# Patient Record
Sex: FEMALE | Race: WHITE | NOT HISPANIC OR LATINO | Employment: FULL TIME | URBAN - METROPOLITAN AREA
[De-identification: names, ages, dates, MRNs, and addresses within clinical notes are randomized per-mention and may not be internally consistent; named-entity substitution may affect disease eponyms.]

---

## 2017-08-17 ENCOUNTER — GENERIC CONVERSION - ENCOUNTER (OUTPATIENT)
Dept: OTHER | Facility: OTHER | Age: 22
End: 2017-08-17

## 2018-01-10 NOTE — PROGRESS NOTES
MAR 29 2016         RE: Omaira Schneider                              To: [facility]  MR#: [id number]                                    [address]   : 1995                                  [address]   ENC: [id number]                             [place], [address]   (Exam #: [id number])                           Fax: [phone]      The LMP of this 24year old,  1, para 0 patient was 2016, her   working CONCEPCIÓN is 2016 and the current gestational age is 9 weeks 1   day by [address] [place] [address]  A sonographic examination was performed on MAR   29 2016 using real time equipment  The ultrasound examination was   performed using abdominal & vaginal techniques  The patient has a BMI of   28 8  Her blood pressure today was 113/72  Earliest ultrasound found in her record: 3/22/16 6w1d 16 CONCEPCIÓN       Davi Storey has felt well over the past week without any significant vaginal   bleeding or pain  She has some normal nausea and vomiting of pregnancy  On exam, the patient appears well, in no acute distress, and her abdomen   is nontender  Multiple longitudinal and transverse sections revealed a carmichael   intrauterine pregnancy  A normal gestational sac was documented  A normal fetal pole was   visualized  Cardiac motion was observed at 157 bpm  The yolk sac was seen  INDICATIONS      confirm IUP      Exam Types      Level I      MEASUREMENTS (* Included In Average GA)      CRL              1 2 cm        7 weeks 2 days *      THE AVERAGE GESTATIONAL AGE is 7 weeks 2 days +/- 5 days  ADNEXA      The left ovary appeared normal and measured 2 4 x 1 6 x 1 9 cm with a   volume of 3 8 cc  The right ovary appeared normal and measured 2 1 x 1 9 x   1 1 cm with a volume of 2 3 cc        IMPRESSION      Carmichael IUP   7 weeks and 2 days by this ultrasound  (CONCEPCIÓN=2016)   7 weeks and 1 day by [address] [place] [address] Sono  (CONCEPCIÓN=2016)   Regular fetal heart rate of 157 bpm      GENERAL COMMENT      Alise Medina presents today for a followup a viability ultrasound given   concerns for a possible interstitial ectopic pregnancy  She was seen here   last week where a right-sided somewhat eccentrically located intrauterine   pregnancy was suspected  Today's ultrasound evaluation his overall   reassuring  I agree that this appears to be a normal intrauterine   pregnancy without any evidence of an interstitial ectopic  Overall he   communicated these findings to the patient  We discussed followup in   detail and the patient does desire genetic screening in the form of   sequential screening  A followup appointment was made for a 12 week   sequential screening  evaluation  Thank you very much for allowing us to participate in the care of this   very nice patient  Should you have any questions, please do not hesitate   to contact our office  Please note, in addition to the time spent discussing the results of the   ultrasound, I spent approximately 10 minutes of face-to-face time with the   patient, greater than 50% of which was spent in counseling and the   coordination of care for this patient  NATHALY Mar M D  Electronically signed 16 13:38           Electronically signed by:Toan LEACH    Mar 30 2016 12:58PM EST

## 2018-01-10 NOTE — PROGRESS NOTES
MAR 22 2016         RE: Татьяна Mail                              To: [facility]  MR#: [id number]                                    [address]   : [address]                                  [address]   ENC: [id number] [place], [address]   (Exam #: [id number])                           Fax: [phone]      The LMP of this 24year old,  1, para 0 patient was 2016, her   working CONCEPCIÓN is 2016 and the current gestational age is 7 weeks 1   day by [address] [place] [address]  A sonographic examination was performed on MAR   22 2016 using real time equipment  The ultrasound examination was   performed using abdominal & vaginal techniques  The patient has a BMI of   29 0  Her blood pressure today was 98/63  Earliest ultrasound found in her record: 3/22/16 6w1d 16 CONCEPCIÓN   Multiple longitudinal and transverse sections revealed a carmichael   intrauterine pregnancy  Cardiac motion was observed at 126 bpm  The yolk sac was not seen  INDICATIONS      confirm IUP      Exam Types      Level I      RESULTS      Fetus # 1 of 1   Fetal growth appeared normal      MEASUREMENTS (* Included In Average GA)      CRL              0 6 cm        6 weeks 1 day  *   Mean G  Sac      1 0 cm      THE AVERAGE GESTATIONAL AGE is 6 weeks 1 day +/- 4 days  ADNEXA      The left ovary was not visualized  The right ovary appeared normal and   measured 2 3 x 2 1 x 1 9 cm with a volume of 4 8 cc  IMPRESSION      Carmichael IUP   6 weeks and 1 day by this ultrasound  (CONCEPCIÓN=2016)   6 weeks and 1 day by [address] [place] [address] Sono  (CONCEPCIÓN=2016)   Fetal growth appeared normal   Regular fetal heart rate of 126 bpm      GENERAL COMMENT      I had the pleasure of seeing Татьяна Gardner in the  Center on     We were concerned about the possibility of a cornual ectopic   pregnancy   On the ultrasound today the pregnancy which has a distinct   heartbeat appears to be high in the uterus and just slightly off to the   right  It does not appear to be a true cornual ectopic pregnancy however I   did recommend that she go to see a radiologist at [place] [address] in [place]   for a further ultrasound to further assess the risk involved  She agreed   to this  Apparently the radiologist read the ultrasound as a true fundal   pregnancy although it is just slightly off to the right side and this is   very reassuring  We will see her next week for one further ultrasound as   well  Thank you kindly for this referral       NATHALY Cesar M D     Maternal-Fetal Medicine   Electronically signed 03/24/16 14:59

## 2018-01-12 NOTE — MISCELLANEOUS
Message  p/c from PT asking if it is normal to still have cramping after a miscarriage  I questioned PT regarding the severity of the cramps and if she was still bleeding  PT stated the cramping is occ    and SL less than her period cramps and bleeding is all but stopped  PT just returned to work yesterday and worked a 12hr shift  I encouraged PT to rest when she can, increase fluids  PT knows to call ofc if bleeding or pain increases  Active Problems    1  , spontaneous (634 90) (O03 9)   2  Cyclical vomiting (546 0) (G43 A0)   3  Encounter for prenatal care in first trimester of first pregnancy (V22 0) (Z34 01)   4  Pregnancy, first, obstetrical care (V22 0) (Z34 00)    Current Meds   1  Prenatal Vitamins TABS; Therapy: (Recorded:2016) to Recorded    Allergies    1  Penicillins   2  Reglan TABS    3  No Known Environmental Allergies   4   No Known Food Allergies    Signatures   Electronically signed by : Eliza Chilel, ; 2016  4:15PM EST                       (Author)

## 2018-01-12 NOTE — MISCELLANEOUS
Message  Pt called office today following ED visit last evening  Pt was told in ED to schedule f/u apt at  center today for possible 'interstitial cornual pregnancy'  Pt was upset on phone as she stated she attempted to schedule with  this morning and was told they do not typically do this type of apt and pt would need to call radiology  Pt stated she then called radiology and was told she could not be seen without a referral  I spoke with [address] [place] [address] (dane)  Per ED pt to be seen in our office today or tomorrow to establish care and then sent for radiology u/s  Returned call to pt, by this time pt was very anxious and upset - stated she felt like nobody was helping her and she was very scared about what she had been told in ED  Pt was reassured that we were going to see her asap and scheduled with [facility] in [place] and was notified she could then be set up for radiology consult if the Dr felt if was advised  Pt was not having any bright red bleeding or abd pain this morning  Pt to call office if any bright red bleeding or sharp pain was noted before apt        Signatures   Electronically signed by : Magdaleno Tayolr, ; Mar 22 2016 11:47AM EST                       (Author)

## 2018-01-13 NOTE — PROGRESS NOTES
Discussion/Summary  Discussion Summary:   WE REVIEWED ALL ULTRASOUND REPORTS FROM PAST FEW DAYS  PATIENT FEELING WELL, DENIES ABDOMINAL PAIN, HAVING BROWN SPOTTING  THIS DOES NOT LOOK LIKE INTERSTITIAL PREGNANCY BASED ON LATEST ULTRASOUND  PATIENT GIVEN ECTOPIC PRECAUTIONS  PELVIC REST RECOMMENDED  F/U ULTRASOUND IN 1 WEEK  SPENT 20 MINUTES WITH PATIENT  GREATER THAN 50% OF THE TIME WAS SPENT COUNSELING AND COORDINATING CARE  Chief Complaint  Chief Complaint Free Text Note Form: New patient- vaginal bleeding in pregnancy with a LMP of 2016  Discuss ultrasound results  History of Present Illness  HPI: 25 YO  @ [address]  SHE HAS HAD F/U ULTRASOUNDS AT [facility] AND RADIOLOGY SINCE THEN AND SHE IS HERE FOR F/U  Active Problems    1  Cornual pregnancy (633 80) (O00 8)    Family History    1  No pertinent family history    2  No pertinent family history    Social History    · Never a smoker    Current Meds   1  Prenatal Vitamins TABS; Therapy: (Recorded:2016) to Recorded    Allergies    1  Penicillins   2  Reglan TABS    3  No Known Environmental Allergies   4  No Known Food Allergies    Vitals  Vital Signs [Data Includes: Current Encounter]    Recorded: [id number] 99:49GK   Systolic 117, LUE, Sitting   Diastolic 70, LUE, Sitting   Height 5 ft 4 in   Weight 171 lb    BMI Calculated 29 35   BSA Calculated 1 83   LMP [id number]     Physical Exam    Constitutional   General appearance: No acute distress, well appearing and well nourished  Abdomen   Abdomen: Normal, non-tender, and no organomegaly noted      Psychiatric   Orientation to person, place, and time: Normal     Mood and affect: Normal        Future Appointments    Date/Time Provider Specialty Site   2016 01:00 PM  [place], [address] [place] [address]   2016 10:00 AM [facility], Nurse Schedule  [facility] 04/18/2016 09:15 AM Julissa Stewart MD Obstetrics/Gynecology [facility] [address] [place] [address]     Signatures   Electronically signed by : KRYSTIN Valles ; Mar 23 2016  2:45PM EST                       (Author)    Electronically signed by : KRYSTIN Valles ; Mar 23 2016  2:48PM EST                       (Author)    Electronically signed by : KRYSTIN Avitia ; Mar 24 2016  3:00PM EST                       (Author)

## 2018-01-13 NOTE — MISCELLANEOUS
Message   Recorded as Task   Date: 09/27/2016 12:43 PM, Created By: Gloria Ford   Task Name: Go to Result   Assigned To: Neetu Calvert   Regarding Patient: Harpreet Casillas, Status: In Progress   Comment:    Emilie Sherman - 27 Sep 2016 12:43 PM     TASK CREATED  pt was seen yesterday for breast lump/pain, and US is neg  Please let her know and offer her opinion from a breast specialist, Dr Gregory Murguia, since we cannot see anything on imaging  Thanks! Nancy Gutierrez - 27 Sep 2016 12:48 PM     TASK IN PROGRESS   Nancy Gutierrez - 27 Sep 2016 12:52 PM     TASK EDITED      lm for pt tcb for results and further instructions   Nancy Gutierrez - 27 Sep 2016 1:47 PM     TASK EDITED          spoke with  pt    she will decide whether she wants a second opinion    gave pt all information        Active Problems    1  Breast lump (611 72) (N63)   2  Cyclical vomiting (220 8) (G43 A0)   3  Encounter for gynecological examination without abnormal finding (V72 31) (Z01 419)    Current Meds   1  Zofran 4 MG Oral Tablet (Ondansetron HCl); Therapy: 11Aug2016 to Recorded    Allergies    1  Penicillins   2  Reglan TABS    3  No Known Environmental Allergies   4   No Known Food Allergies    Signatures   Electronically signed by : Booker Boyd, ; Sep 27 2016  1:47PM EST                       (Author)

## 2018-01-15 NOTE — MISCELLANEOUS
Message  Message Free Text Note Form: spoke to Dr Poli Valdez last night from [facility]  Patient Lindsey Riley  89 @ 6 weeks who is stable has possible intersitial pregnancy and would like to follow up with our practice  We discussed case Dr Mackenzie Ramirez viewed images and tought it was IUP and not Interstial  Plan of care is to have patient be seen by perinatology to get there input as to the location of pregnancy        Signatures   Electronically signed by : KRYSTIN Orellana ; Mar 22 2016 11:29AM EST                       (Author)    Electronically signed by : KRYSTIN Orellana ; Mar 22 2016 12:47PM EST                       (Author)

## 2018-01-17 NOTE — MISCELLANEOUS
Message  Pt called office  States she has been having some daily drown spotting - today pt reports some bright red spotting  Pt states it is minimal amount but is the color of period blood  Pt denies sharp abd pain, reports some more mild cramping  Pt was seen in ED on 3/12 and IUP was not visible at this time  Pt advised as she is not yet our pt she needs to go to ED to be seen  Explained to pt the need for lab work and u/s  Explained we need to see viable IUP before we can rule out ectopic  Pt aware we can schedule f/u in our office following ED visit to establish pt to our practice        Signatures   Electronically signed by : Fely Muniz, ; Mar 21 2016  4:24PM EST                       (Author)

## 2018-01-17 NOTE — MISCELLANEOUS
Message   Recorded as Task   Date: 2016 08:35 AM, Created By: Yessy Guerra   Task Name: Care Coordination   Assigned To: AGNES SOL,Team   Regarding Patient: hRiannon Solitario, Status: In Progress   Comment:    Yessy Guerra - 2016 8:35 AM     TASK CREATED  Called pt to re schedule her 1st ob visit  Pt states that she went to the ER and miscarried  Please call pt she is not sure what to do now  Thank you   [facility] - 2016 12:18 PM     TASK IN PROGRESS   [facility] - 2016 12:27 PM     TASK REPLIED TO: Previously Assigned To [address] with pt  States she is having some mild cramping and light bleeding  Pt was very emotional - also very upset because she did not have a good expereince in ED  Pt aware she is to go for HCG check today and weekly until negaitve  Pt for f/u in our office on   Bleeding precations given  Pt aware she can call anytime  Pt may call for work note  Vicky Monique - 2016 12:03 PM     TASK REPLIED TO: Previously Assigned To [facility],Team  Pt called office  Was seen in ED on  for abd pain and cyclical vomitting  At this time pt was told it appears she has a virus/exacerbation of cyclic vomitting syndrome  Pt also had u/s that showed possible retained products of conception  Pt was advised to f/u with us  EDierking aware and reviewed u/s from   Pt reports light pink spotting now, no real abd pain  Pt has congestion and occasional chills  Denies fever, pain  Pt to monitor at home for fever, abd pain, vaginal odor, or bleeding precautions - knows to call oncall day or night  Vicky Monique - 2016 11:30 AM     TASK REPLIED TO: Previously Assigned To [facility],Team  Pt saw ED today  See note from  apt  Active Problems    1  , spontaneous (634 90) (O03 9)   2  Cyclical vomiting (187 1) (G43 A0)   3  Encounter for prenatal care in first trimester of first pregnancy (V22 0) (Z34 01)   4   Pregnancy, first, obstetrical care (V22 0) (Z34 00)    Current Meds   1  No Reported Medications Recorded    Allergies    1  Penicillins   2  Reglan TABS    3  No Known Environmental Allergies   4   No Known Food Allergies    Signatures   Electronically signed by : Diana Crowley, ; Apr 28 2016 11:30AM EST                       (Author)

## 2018-01-18 NOTE — RESULT NOTES
Message   Recorded as Task   Date: 09/26/2016 09:20 AM, Created By: Jeri Cooney   Task Name: Follow Up   Assigned To: Noah Fee   Regarding Patient: Luly Chavez, Status: In Progress   CommentBlnils Deuel - 26 Sep 2016 9:20 AM     TASK CREATED  Caller: Self; [phone] (Home)  having breasgt pain for 3 wks,please advise [phone]   [facility] - 26 Sep 2016 9:31 AM     TASK IN PROGRESS   Marylene Noon - 26 Sep 2016 9:36 AM     TASK EDITED   I gave pt ov in wg   Marylene Noon - 26 Sep 2016 9:36 AM     TASK EDITED        Signatures   Electronically signed by :  Nat Lane, ; Sep 26 2016  9:37AM EST                       (Author)

## 2018-01-22 VITALS
SYSTOLIC BLOOD PRESSURE: 100 MMHG | BODY MASS INDEX: 29.19 KG/M2 | DIASTOLIC BLOOD PRESSURE: 58 MMHG | WEIGHT: 171 LBS | HEIGHT: 64 IN

## 2018-08-24 ENCOUNTER — TELEPHONE (OUTPATIENT)
Dept: OBGYN CLINIC | Facility: CLINIC | Age: 23
End: 2018-08-24

## 2018-08-24 DIAGNOSIS — R11.15 CYCLIC VOMITING SYNDROME: Primary | ICD-10-CM

## 2018-08-24 RX ORDER — ONDANSETRON 4 MG/1
4 TABLET, ORALLY DISINTEGRATING ORAL EVERY 6 HOURS PRN
Qty: 30 TABLET | Refills: 0 | Status: SHIPPED | OUTPATIENT
Start: 2018-08-24 | End: 2019-06-14

## 2018-08-24 NOTE — TELEPHONE ENCOUNTER
Received phone call from patient  Patient is MARIA VICTORIA  LMP7/23/18   Has history of Cyclic Vomitting Syndrome and would like to know if we can order Zofran dissolvable  She is not vomiting as of yet  States started at about 5-6 weeks with last pregnancy and would like to be prepared  Will route to provider to advise

## 2018-08-29 ENCOUNTER — TELEPHONE (OUTPATIENT)
Dept: OBGYN CLINIC | Facility: CLINIC | Age: 23
End: 2018-08-29

## 2018-09-24 ENCOUNTER — OFFICE VISIT (OUTPATIENT)
Dept: OBGYN CLINIC | Facility: MEDICAL CENTER | Age: 23
End: 2018-09-24
Payer: COMMERCIAL

## 2018-09-24 VITALS — SYSTOLIC BLOOD PRESSURE: 124 MMHG | WEIGHT: 181.6 LBS | DIASTOLIC BLOOD PRESSURE: 64 MMHG | BODY MASS INDEX: 31.17 KG/M2

## 2018-09-24 DIAGNOSIS — N91.2 AMENORRHEA: Primary | ICD-10-CM

## 2018-09-24 PROCEDURE — 76801 OB US < 14 WKS SINGLE FETUS: CPT | Performed by: PHYSICIAN ASSISTANT

## 2018-09-24 PROCEDURE — 99213 OFFICE O/P EST LOW 20 MIN: CPT | Performed by: PHYSICIAN ASSISTANT

## 2018-09-24 NOTE — ASSESSMENT & PLAN NOTE
(+) viable IUP, size consistent with dates  CONCEPCIÓN 4/29/19 by LMP  Pt congratulated and questions answered  RTO for interview and PN1

## 2018-09-24 NOTE — PROGRESS NOTES
Early OB Ultrasound Procedure Note  Garrick Guerra  YOB: 1995      Referring Physician: Self, Referral     Technician: Study performed by the interpreting physician assistant    Indications:  amenorrhea   /64 (BP Location: Right arm)   Wt 82 4 kg (181 lb 9 6 oz)   LMP 2018 (Exact Date)   BMI 31 17 kg/m²     Patient's last menstrual period was 2018 (exact date)  , , with EGA of  9 weeks and 0   days    Patient had light bleeding 18 but resolved    SAB x1 9wks    Procedure Details  A gestational sac is seen containing a yolk sac and a carmichael embryo  The embryonic crown-rump length measures 2 31 cm  and calculates to an estimated gestational age of 10 weeks and 0   Days    Embryonic cardiac activity is  present  Description of fetal anatomy Normal  Description of ovaries: normal  Description of uterus: normal    Findings:  Viable, carmichael intrauterine pregnancy at 9 weeks,  0 days, with a calculated EDC of 2019 by LMP

## 2018-10-08 ENCOUNTER — INITIAL PRENATAL (OUTPATIENT)
Dept: OBGYN CLINIC | Facility: MEDICAL CENTER | Age: 23
End: 2018-10-08

## 2018-10-08 ENCOUNTER — APPOINTMENT (OUTPATIENT)
Dept: LAB | Facility: MEDICAL CENTER | Age: 23
End: 2018-10-08
Payer: COMMERCIAL

## 2018-10-08 VITALS
BODY MASS INDEX: 30.56 KG/M2 | SYSTOLIC BLOOD PRESSURE: 110 MMHG | RESPIRATION RATE: 14 BRPM | DIASTOLIC BLOOD PRESSURE: 64 MMHG | WEIGHT: 179 LBS | HEIGHT: 64 IN

## 2018-10-08 DIAGNOSIS — Z34.01 ENCOUNTER FOR SUPERVISION OF NORMAL FIRST PREGNANCY IN FIRST TRIMESTER: ICD-10-CM

## 2018-10-08 DIAGNOSIS — Z34.91 FIRST TRIMESTER PREGNANCY: ICD-10-CM

## 2018-10-08 DIAGNOSIS — Z34.91 FIRST TRIMESTER PREGNANCY: Primary | ICD-10-CM

## 2018-10-08 LAB
ABO GROUP BLD: NORMAL
BACTERIA UR QL AUTO: ABNORMAL /HPF
BASOPHILS # BLD AUTO: 0.03 THOUSANDS/ΜL (ref 0–0.1)
BASOPHILS NFR BLD AUTO: 0 % (ref 0–1)
BILIRUB UR QL STRIP: NEGATIVE
BLD GP AB SCN SERPL QL: NEGATIVE
CLARITY UR: ABNORMAL
COLOR UR: YELLOW
EOSINOPHIL # BLD AUTO: 0.03 THOUSAND/ΜL (ref 0–0.61)
EOSINOPHIL NFR BLD AUTO: 0 % (ref 0–6)
ERYTHROCYTE [DISTWIDTH] IN BLOOD BY AUTOMATED COUNT: 13.6 % (ref 11.6–15.1)
GLUCOSE UR STRIP-MCNC: NEGATIVE MG/DL
HCT VFR BLD AUTO: 39.1 % (ref 34.8–46.1)
HGB BLD-MCNC: 13.3 G/DL (ref 11.5–15.4)
HGB UR QL STRIP.AUTO: NEGATIVE
HYALINE CASTS #/AREA URNS LPF: ABNORMAL /LPF
IMM GRANULOCYTES # BLD AUTO: 0.04 THOUSAND/UL (ref 0–0.2)
IMM GRANULOCYTES NFR BLD AUTO: 0 % (ref 0–2)
KETONES UR STRIP-MCNC: NEGATIVE MG/DL
LEUKOCYTE ESTERASE UR QL STRIP: ABNORMAL
LYMPHOCYTES # BLD AUTO: 2.05 THOUSANDS/ΜL (ref 0.6–4.47)
LYMPHOCYTES NFR BLD AUTO: 17 % (ref 14–44)
MCH RBC QN AUTO: 29.3 PG (ref 26.8–34.3)
MCHC RBC AUTO-ENTMCNC: 34 G/DL (ref 31.4–37.4)
MCV RBC AUTO: 86 FL (ref 82–98)
MONOCYTES # BLD AUTO: 0.63 THOUSAND/ΜL (ref 0.17–1.22)
MONOCYTES NFR BLD AUTO: 5 % (ref 4–12)
NEUTROPHILS # BLD AUTO: 9.41 THOUSANDS/ΜL (ref 1.85–7.62)
NEUTS SEG NFR BLD AUTO: 78 % (ref 43–75)
NITRITE UR QL STRIP: NEGATIVE
NON-SQ EPI CELLS URNS QL MICRO: ABNORMAL /HPF
NRBC BLD AUTO-RTO: 0 /100 WBCS
PH UR STRIP.AUTO: 6.5 [PH] (ref 4.5–8)
PLATELET # BLD AUTO: 204 THOUSANDS/UL (ref 149–390)
PMV BLD AUTO: 9.5 FL (ref 8.9–12.7)
PROT UR STRIP-MCNC: NEGATIVE MG/DL
RBC # BLD AUTO: 4.54 MILLION/UL (ref 3.81–5.12)
RBC #/AREA URNS AUTO: ABNORMAL /HPF
RH BLD: NEGATIVE
RUBV IGG SERPL IA-ACNC: 93.8 IU/ML
SP GR UR STRIP.AUTO: 1.01 (ref 1–1.03)
SPECIMEN EXPIRATION DATE: NORMAL
UROBILINOGEN UR QL STRIP.AUTO: 0.2 E.U./DL
WBC # BLD AUTO: 12.19 THOUSAND/UL (ref 4.31–10.16)
WBC #/AREA URNS AUTO: ABNORMAL /HPF

## 2018-10-08 PROCEDURE — 80081 OBSTETRIC PANEL INC HIV TSTG: CPT

## 2018-10-08 PROCEDURE — 81001 URINALYSIS AUTO W/SCOPE: CPT

## 2018-10-08 PROCEDURE — 87086 URINE CULTURE/COLONY COUNT: CPT

## 2018-10-08 PROCEDURE — OBC: Performed by: OBSTETRICS & GYNECOLOGY

## 2018-10-08 PROCEDURE — 36415 COLL VENOUS BLD VENIPUNCTURE: CPT

## 2018-10-08 NOTE — PROGRESS NOTES
Cheryl Alva came to the OB Intake with her Partner they reports that this was a planned pregnancy they are happy with the pregnancy   Patient is concern she reports that she had a miscarriage in 03/2015   Patient reports that she is doing better she is not vomiting as much as she was  Patient reports that she works as a  but is doing well  Patient is taking her prenatal vitamins she has zofran pills she reports that she will only take them if she needs to  Patient reports that she does not have any cats at home and she and her partner do not have any traveling plans during her pregnancy  Patient reports that she is planning to breast feed the baby   Patient is unsure if she is going to do any Genetic testing on the baby but will like a Counsulltation with the Maternal Fetal  Medicine a referral was given to the patient and an appoitment was schedule for her at today visit she has an appointment with MAURISIO on October 16th,2018at the [facility]  Patient is planning to do her prenatal blood work and she schedule her PN1 appointment before she left the office today

## 2018-10-09 LAB
BACTERIA UR CULT: NORMAL
HBV SURFACE AG SER QL: NORMAL
RPR SER QL: NORMAL

## 2018-10-10 LAB — HIV 1+2 AB+HIV1 P24 AG SERPL QL IA: NORMAL

## 2018-10-12 ENCOUNTER — TELEPHONE (OUTPATIENT)
Dept: OBGYN CLINIC | Facility: CLINIC | Age: 23
End: 2018-10-12

## 2018-10-12 NOTE — TELEPHONE ENCOUNTER
Patient  Called saw her results in My Chart and would like someone to go over them with her,  bloodwork and urine that was done on Monday

## 2018-10-16 ENCOUNTER — ROUTINE PRENATAL (OUTPATIENT)
Dept: PERINATAL CARE | Facility: MEDICAL CENTER | Age: 23
End: 2018-10-16
Payer: COMMERCIAL

## 2018-10-16 VITALS
BODY MASS INDEX: 31.07 KG/M2 | WEIGHT: 182 LBS | HEIGHT: 64 IN | DIASTOLIC BLOOD PRESSURE: 71 MMHG | HEART RATE: 99 BPM | SYSTOLIC BLOOD PRESSURE: 116 MMHG

## 2018-10-16 DIAGNOSIS — Z3A.12 12 WEEKS GESTATION OF PREGNANCY: ICD-10-CM

## 2018-10-16 DIAGNOSIS — O20.9 FIRST TRIMESTER BLEEDING: ICD-10-CM

## 2018-10-16 DIAGNOSIS — Z34.91 FIRST TRIMESTER PREGNANCY: ICD-10-CM

## 2018-10-16 DIAGNOSIS — O99.211 OBESITY AFFECTING PREGNANCY IN FIRST TRIMESTER: Primary | ICD-10-CM

## 2018-10-16 PROCEDURE — 76801 OB US < 14 WKS SINGLE FETUS: CPT | Performed by: OBSTETRICS & GYNECOLOGY

## 2018-10-16 PROCEDURE — 99201 PR OFFICE OUTPATIENT NEW 10 MINUTES: CPT | Performed by: OBSTETRICS & GYNECOLOGY

## 2018-10-16 NOTE — LETTER
October 16, 2018     Jami Pino [address]    Patient: Mireille Frausto   YOB: 1995   Date of Visit: 10/16/2018       Dear Dr Damir Rodrigues: Thank you for referring Kaylee Oropeza to me for evaluation  Below are my notes for this consultation  If you have questions, please do not hesitate to call me  I look forward to following your patient along with you  Sincerely,        Berna Rosario MD        CC: No Recipients  Berna Rosario MD  10/16/2018  3:23 PM  Sign at close encounter  Please refer to the [facility] ultrasound report in Ob Procedures for additional information regarding the visit to the [facility]  today

## 2018-10-16 NOTE — PROGRESS NOTES
Please refer to the [facility] ultrasound report in Ob Procedures for additional information regarding the visit to the [facility]  today

## 2018-10-29 ENCOUNTER — INITIAL PRENATAL (OUTPATIENT)
Dept: OBGYN CLINIC | Facility: MEDICAL CENTER | Age: 23
End: 2018-10-29

## 2018-10-29 VITALS — SYSTOLIC BLOOD PRESSURE: 118 MMHG | DIASTOLIC BLOOD PRESSURE: 68 MMHG | BODY MASS INDEX: 31.41 KG/M2 | WEIGHT: 183 LBS

## 2018-10-29 DIAGNOSIS — Z34.02 FIRST PREGNANCY, SECOND TRIMESTER: ICD-10-CM

## 2018-10-29 DIAGNOSIS — R11.15 NON-INTRACTABLE CYCLICAL VOMITING WITH NAUSEA: ICD-10-CM

## 2018-10-29 DIAGNOSIS — Z34.90 PREGNANCY, UNSPECIFIED GESTATIONAL AGE: Primary | ICD-10-CM

## 2018-10-29 PROBLEM — N91.2 AMENORRHEA: Status: RESOLVED | Noted: 2018-09-24 | Resolved: 2018-10-29

## 2018-10-29 PROCEDURE — 87491 CHLMYD TRACH DNA AMP PROBE: CPT | Performed by: PHYSICIAN ASSISTANT

## 2018-10-29 PROCEDURE — PNV: Performed by: PHYSICIAN ASSISTANT

## 2018-10-29 PROCEDURE — 87591 N.GONORRHOEAE DNA AMP PROB: CPT | Performed by: PHYSICIAN ASSISTANT

## 2018-10-29 NOTE — ASSESSMENT & PLAN NOTE
Here for first OB   Feels well overall  No fetal movement yet  First OB labs normal  Blood type O NEG - will need RhoGam  Pap not due - GC/chlamydia sent  Declines flu shot  Had MFM consult, declines genetic testing  Level II scheduled

## 2018-10-29 NOTE — PROGRESS NOTES
1 ST OB VISIT, she has no complaints  Pap up to date 8/11/2016,neg  Declines Flu vaccine   Gc/chlamydia done today

## 2018-10-29 NOTE — ASSESSMENT & PLAN NOTE
Quiet with this pregnancy   Last pregnancy (8 week demise) had significant vomiting so she is pleasantly surprised with her lack of nausea  Has Zofran on hand to use PRN

## 2018-10-29 NOTE — PROGRESS NOTES
Problem List Items Addressed This Visit        Digestive    Cyclic vomiting syndrome     Quiet with this pregnancy   Last pregnancy (8 week demise) had significant vomiting so she is pleasantly surprised with her lack of nausea  Has Zofran on hand to use PRN            Other    First pregnancy, second trimester     Here for first OB   Feels well overall  No fetal movement yet  First OB labs normal  Blood type O NEG - will need RhoGam  Pap not due - GC/chlamydia sent  Declines flu shot  Had MFM consult, declines genetic testing  Level II scheduled           Other Visit Diagnoses     Pregnancy, unspecified gestational age    -  Primary    Relevant Orders    Chlamydia/GC amplified DNA by PCR

## 2018-11-01 LAB
CHLAMYDIA DNA CVX QL NAA+PROBE: NORMAL
N GONORRHOEA DNA GENITAL QL NAA+PROBE: NORMAL

## 2018-11-29 ENCOUNTER — ROUTINE PRENATAL (OUTPATIENT)
Dept: OBGYN CLINIC | Facility: MEDICAL CENTER | Age: 23
End: 2018-11-29

## 2018-11-29 VITALS
WEIGHT: 185.4 LBS | HEIGHT: 64 IN | DIASTOLIC BLOOD PRESSURE: 68 MMHG | BODY MASS INDEX: 31.65 KG/M2 | RESPIRATION RATE: 14 BRPM | SYSTOLIC BLOOD PRESSURE: 110 MMHG

## 2018-11-29 DIAGNOSIS — Z3A.18 18 WEEKS GESTATION OF PREGNANCY: Primary | ICD-10-CM

## 2018-11-29 PROCEDURE — PNV: Performed by: OBSTETRICS & GYNECOLOGY

## 2018-11-29 NOTE — PROGRESS NOTES
Patient is a 76-year-old female, PO, at 18 weeks and 3 days, here for routine prenatal visit  Pregnancy complicated by Rh-negative status

## 2018-12-11 ENCOUNTER — ROUTINE PRENATAL (OUTPATIENT)
Dept: PERINATAL CARE | Facility: MEDICAL CENTER | Age: 23
End: 2018-12-11
Payer: COMMERCIAL

## 2018-12-11 VITALS
WEIGHT: 187 LBS | HEART RATE: 101 BPM | BODY MASS INDEX: 31.92 KG/M2 | HEIGHT: 64 IN | DIASTOLIC BLOOD PRESSURE: 80 MMHG | SYSTOLIC BLOOD PRESSURE: 125 MMHG

## 2018-12-11 DIAGNOSIS — Z36.86 ENCOUNTER FOR ANTENATAL SCREENING FOR CERVICAL LENGTH: ICD-10-CM

## 2018-12-11 DIAGNOSIS — O99.212 OBESITY AFFECTING PREGNANCY IN SECOND TRIMESTER: Primary | ICD-10-CM

## 2018-12-11 DIAGNOSIS — Z3A.20 20 WEEKS GESTATION OF PREGNANCY: ICD-10-CM

## 2018-12-11 DIAGNOSIS — Z36.3 ENCOUNTER FOR ANTENATAL SCREENING FOR MALFORMATIONS: ICD-10-CM

## 2018-12-11 PROCEDURE — 76817 TRANSVAGINAL US OBSTETRIC: CPT | Performed by: OBSTETRICS & GYNECOLOGY

## 2018-12-11 PROCEDURE — 99212 OFFICE O/P EST SF 10 MIN: CPT | Performed by: OBSTETRICS & GYNECOLOGY

## 2018-12-11 PROCEDURE — 76811 OB US DETAILED SNGL FETUS: CPT | Performed by: OBSTETRICS & GYNECOLOGY

## 2018-12-11 NOTE — PROGRESS NOTES
[facility] [place]: Ms Nikkie López was seen today at 20w1d for anatomic survey and cervical length screening ultrasound  See ultrasound report under "OB Procedures" tab  Please don't hesitate to contact our office with any concerns or questions    Bisi Pastrana MD

## 2018-12-11 NOTE — PATIENT INSTRUCTIONS
Thank you for choosing [facility] for your  care today  If you have any questions about your ultrasound or care, please do not hesitate to contact us or your primary obstetrician  Please consider taking low-dose aspirin 81mg daily for the prevention of preeclampsia beginning now  Please consider getting your influenza vaccine  Pregnant women and children are at higher risk of influenza-related complications  The vaccine, which is never 100% effective, biologically cannot cause influenza and remains the only protection we can offer against influenza which can be fatal   Please aim for vaccination of your entire household and insist that anyone coming into contact with your baby be vaccinated

## 2018-12-11 NOTE — PROGRESS NOTES
A transvaginal ultrasound was performed  Sonographer note on use of High Level Disinfection Process (Trophon) for transvaginal probe# 2 used, serial [id number]    [facility] [place]

## 2018-12-28 ENCOUNTER — TELEPHONE (OUTPATIENT)
Dept: OBGYN CLINIC | Facility: MEDICAL CENTER | Age: 23
End: 2018-12-28

## 2018-12-28 NOTE — TELEPHONE ENCOUNTER
Patient returned call - for a day or two she has had a sore throat  No cough, fever or chills  No heat palpitations  Good fetal movement  No leakage of fluids  Recommended patient to use Robitussin plain or sudafed  She can also use Cepacol - all over the counter  Keep herself hydrated  Advised her that if she does feel she has a fever to call her PCP or us  Stated she understood

## 2019-01-03 ENCOUNTER — ROUTINE PRENATAL (OUTPATIENT)
Dept: OBGYN CLINIC | Facility: MEDICAL CENTER | Age: 24
End: 2019-01-03

## 2019-01-03 VITALS — DIASTOLIC BLOOD PRESSURE: 64 MMHG | BODY MASS INDEX: 32.79 KG/M2 | WEIGHT: 191 LBS | SYSTOLIC BLOOD PRESSURE: 100 MMHG

## 2019-01-03 DIAGNOSIS — Z3A.20 20 WEEKS GESTATION OF PREGNANCY: ICD-10-CM

## 2019-01-03 DIAGNOSIS — Z34.02 FIRST PREGNANCY, SECOND TRIMESTER: ICD-10-CM

## 2019-01-03 PROBLEM — O99.210 OBESITY AFFECTING PREGNANCY: Status: ACTIVE | Noted: 2019-01-03

## 2019-01-03 PROBLEM — Z34.92 ENCOUNTER FOR SUPERVISION OF NORMAL PREGNANCY IN SECOND TRIMESTER: Status: ACTIVE | Noted: 2019-01-03

## 2019-01-03 PROCEDURE — PNV: Performed by: PHYSICIAN ASSISTANT

## 2019-01-03 NOTE — ASSESSMENT & PLAN NOTE
Feels well  Good FM  No ctx, LOF, VB  Level II WNL - it's a girl!  Natividad  28 wk lab slip provided today w/ instructions to complete  Reviewed s/sx PTL, reasons to call    Declines flu vaccine

## 2019-01-03 NOTE — PROGRESS NOTES
Encounter for supervision of normal pregnancy in second trimester  Feels well  Good FM  No ctx, LOF, VB  Level II WNL - it's a girl!  Natividad  28 wk lab slip provided today w/ instructions to complete  Reviewed s/sx PTL, reasons to call    Declines flu vaccine    Obesity affecting pregnancy  Will return to [facility] for growth scan at 32 wks

## 2019-01-15 ENCOUNTER — TELEPHONE (OUTPATIENT)
Dept: OBGYN CLINIC | Facility: MEDICAL CENTER | Age: 24
End: 2019-01-15

## 2019-01-15 NOTE — TELEPHONE ENCOUNTER
Pt went to urgent care a 2nd time and they did not feel she needed another antibiotic so they advised, otc remedies such as sudafed, throat lozenges, as we do

## 2019-01-22 ENCOUNTER — ROUTINE PRENATAL (OUTPATIENT)
Dept: OBGYN CLINIC | Facility: CLINIC | Age: 24
End: 2019-01-22

## 2019-01-22 VITALS — WEIGHT: 196 LBS | BODY MASS INDEX: 33.64 KG/M2 | DIASTOLIC BLOOD PRESSURE: 58 MMHG | SYSTOLIC BLOOD PRESSURE: 102 MMHG

## 2019-01-22 DIAGNOSIS — Z3A.26 26 WEEKS GESTATION OF PREGNANCY: ICD-10-CM

## 2019-01-22 DIAGNOSIS — Z34.82 ENCOUNTER FOR SUPERVISION OF OTHER NORMAL PREGNANCY IN SECOND TRIMESTER: Primary | ICD-10-CM

## 2019-01-22 PROCEDURE — PNV: Performed by: OBSTETRICS & GYNECOLOGY

## 2019-01-22 RX ORDER — AZITHROMYCIN 250 MG/1
TABLET, FILM COATED ORAL
Refills: 0 | COMMUNITY
Start: 2019-01-01 | End: 2019-01-22 | Stop reason: ALTCHOICE

## 2019-01-22 NOTE — PROGRESS NOTES
Patient declined flu vaccine - discussed rationale for flu shot in pregnancy, she will consider it  Patient was given 28 week labs slip  No concerns or complaints at this time  Will get Tdap at next visit  Declined LDASA per [facility]  Has follow up growth scan at 32 weeks

## 2019-01-29 ENCOUNTER — APPOINTMENT (OUTPATIENT)
Dept: LAB | Facility: MEDICAL CENTER | Age: 24
End: 2019-01-29
Payer: COMMERCIAL

## 2019-01-29 DIAGNOSIS — Z34.02 FIRST PREGNANCY, SECOND TRIMESTER: ICD-10-CM

## 2019-01-29 DIAGNOSIS — Z3A.26 26 WEEKS GESTATION OF PREGNANCY: ICD-10-CM

## 2019-01-29 LAB
BASOPHILS # BLD AUTO: 0.03 THOUSANDS/ΜL (ref 0–0.1)
BASOPHILS NFR BLD AUTO: 0 % (ref 0–1)
EOSINOPHIL # BLD AUTO: 0.05 THOUSAND/ΜL (ref 0–0.61)
EOSINOPHIL NFR BLD AUTO: 0 % (ref 0–6)
ERYTHROCYTE [DISTWIDTH] IN BLOOD BY AUTOMATED COUNT: 14.4 % (ref 11.6–15.1)
GLUCOSE 1H P 50 G GLC PO SERPL-MCNC: 120 MG/DL
HCT VFR BLD AUTO: 35.8 % (ref 34.8–46.1)
HGB BLD-MCNC: 11.8 G/DL (ref 11.5–15.4)
IMM GRANULOCYTES # BLD AUTO: 0.14 THOUSAND/UL (ref 0–0.2)
IMM GRANULOCYTES NFR BLD AUTO: 1 % (ref 0–2)
LYMPHOCYTES # BLD AUTO: 1.89 THOUSANDS/ΜL (ref 0.6–4.47)
LYMPHOCYTES NFR BLD AUTO: 13 % (ref 14–44)
MCH RBC QN AUTO: 29.9 PG (ref 26.8–34.3)
MCHC RBC AUTO-ENTMCNC: 33 G/DL (ref 31.4–37.4)
MCV RBC AUTO: 91 FL (ref 82–98)
MONOCYTES # BLD AUTO: 0.62 THOUSAND/ΜL (ref 0.17–1.22)
MONOCYTES NFR BLD AUTO: 4 % (ref 4–12)
NEUTROPHILS # BLD AUTO: 11.55 THOUSANDS/ΜL (ref 1.85–7.62)
NEUTS SEG NFR BLD AUTO: 82 % (ref 43–75)
NRBC BLD AUTO-RTO: 0 /100 WBCS
PLATELET # BLD AUTO: 182 THOUSANDS/UL (ref 149–390)
PMV BLD AUTO: 9.4 FL (ref 8.9–12.7)
RBC # BLD AUTO: 3.94 MILLION/UL (ref 3.81–5.12)
WBC # BLD AUTO: 14.28 THOUSAND/UL (ref 4.31–10.16)

## 2019-01-29 PROCEDURE — 86592 SYPHILIS TEST NON-TREP QUAL: CPT

## 2019-01-29 PROCEDURE — 36415 COLL VENOUS BLD VENIPUNCTURE: CPT

## 2019-01-29 PROCEDURE — 85025 COMPLETE CBC W/AUTO DIFF WBC: CPT

## 2019-01-29 PROCEDURE — 82950 GLUCOSE TEST: CPT

## 2019-01-30 ENCOUNTER — TELEPHONE (OUTPATIENT)
Dept: OBGYN CLINIC | Facility: CLINIC | Age: 24
End: 2019-01-30

## 2019-01-30 LAB — RPR SER QL: NORMAL

## 2019-01-31 ENCOUNTER — TELEPHONE (OUTPATIENT)
Dept: OBGYN CLINIC | Facility: CLINIC | Age: 24
End: 2019-01-31

## 2019-02-05 ENCOUNTER — ROUTINE PRENATAL (OUTPATIENT)
Dept: OBGYN CLINIC | Facility: CLINIC | Age: 24
End: 2019-02-05
Payer: COMMERCIAL

## 2019-02-05 VITALS — DIASTOLIC BLOOD PRESSURE: 62 MMHG | BODY MASS INDEX: 34.3 KG/M2 | SYSTOLIC BLOOD PRESSURE: 102 MMHG | WEIGHT: 199.8 LBS

## 2019-02-05 DIAGNOSIS — Z3A.28 28 WEEKS GESTATION OF PREGNANCY: ICD-10-CM

## 2019-02-05 DIAGNOSIS — O26.893 RH NEGATIVE STATUS DURING PREGNANCY IN THIRD TRIMESTER: ICD-10-CM

## 2019-02-05 DIAGNOSIS — Z23 NEED FOR TDAP VACCINATION: Primary | ICD-10-CM

## 2019-02-05 DIAGNOSIS — Z34.93 THIRD TRIMESTER PREGNANCY: ICD-10-CM

## 2019-02-05 DIAGNOSIS — Z67.91 RH NEGATIVE STATUS DURING PREGNANCY IN THIRD TRIMESTER: ICD-10-CM

## 2019-02-05 PROCEDURE — 96372 THER/PROPH/DIAG INJ SC/IM: CPT | Performed by: OBSTETRICS & GYNECOLOGY

## 2019-02-05 PROCEDURE — PNV: Performed by: OBSTETRICS & GYNECOLOGY

## 2019-02-05 NOTE — PROGRESS NOTES
Problem List Items Addressed This Visit        Other    28 weeks gestation of pregnancy     Patient feels well without complaint  She continues to work as a  sometimes 12 hr shifts and she does well  Twenty-eight week laboratory studies were normal     Patient has declined to initiate aspirin per [facility] protocol she also declines flu vaccination  She was made aware of the potential risks  Patient received RhoGAM today  Patient has follow-up for growth at 32 weeks at the  Center           Third trimester pregnancy    Relevant Medications    Rho(D) immune globulin (RHOGAM ULTRA-FILTERED PLUS) IM injection 300 mcg (Start on 2019 10:00 AM)    Rh negative status during pregnancy in third trimester    Relevant Medications    Rho(D) immune globulin (RHOGAM ULTRA-FILTERED PLUS) IM injection 300 mcg (Start on 2019 10:00 AM)      Other Visit Diagnoses     Need for Tdap vaccination    -  Primary

## 2019-02-05 NOTE — PROGRESS NOTES
O neg, declined flu shot, labs done, passed 1 hour GTT, Rhogam to be given today, will think about Tdap for next visit  Rhogam given today, right deltoid with no complication  Lot [id number] exp 02/16/20

## 2019-02-05 NOTE — ASSESSMENT & PLAN NOTE
Patient feels well without complaint  She continues to work as a  sometimes 12 hr shifts and she does well  Twenty-eight week laboratory studies were normal     Patient has declined to initiate aspirin per [facility] protocol she also declines flu vaccination  She was made aware of the potential risks  Patient received RhoGAM today  Patient has follow-up for growth at 32 weeks at the  Center

## 2019-02-19 ENCOUNTER — ROUTINE PRENATAL (OUTPATIENT)
Dept: OBGYN CLINIC | Facility: CLINIC | Age: 24
End: 2019-02-19

## 2019-02-19 VITALS — BODY MASS INDEX: 34.5 KG/M2 | DIASTOLIC BLOOD PRESSURE: 50 MMHG | WEIGHT: 201 LBS | SYSTOLIC BLOOD PRESSURE: 100 MMHG

## 2019-02-19 DIAGNOSIS — Z34.83 ENCOUNTER FOR SUPERVISION OF OTHER NORMAL PREGNANCY IN THIRD TRIMESTER: Primary | ICD-10-CM

## 2019-02-19 PROBLEM — Z34.93 ENCOUNTER FOR SUPERVISION OF NORMAL PREGNANCY IN THIRD TRIMESTER: Status: ACTIVE | Noted: 2019-01-03

## 2019-02-19 PROCEDURE — PNV: Performed by: OBSTETRICS & GYNECOLOGY

## 2019-02-19 NOTE — PROGRESS NOTES
Reviewed patient reaction to TDAP as a child with her  Her reaction is listed under the "may not recommend another dose of the pertussis vaccine" and she did not get further as a child  Not anaphylactic reaction - so not unreasonable to consider dosing currently  We also discussed possible dosing in postpartum period while in hospital in case of reaction  She is going to continue to consider, and will let us know next visit if she desires  Doing well otherwise - some round ligament pain

## 2019-02-19 NOTE — PROGRESS NOTES
Baby moving well  Had Rhogam last visit  Has breast pump slip  Perineal massage paper given  Declines flu shot

## 2019-03-05 ENCOUNTER — ULTRASOUND (OUTPATIENT)
Dept: PERINATAL CARE | Facility: MEDICAL CENTER | Age: 24
End: 2019-03-05
Payer: COMMERCIAL

## 2019-03-05 VITALS
WEIGHT: 204.8 LBS | BODY MASS INDEX: 34.97 KG/M2 | HEART RATE: 99 BPM | SYSTOLIC BLOOD PRESSURE: 113 MMHG | DIASTOLIC BLOOD PRESSURE: 77 MMHG | HEIGHT: 64 IN

## 2019-03-05 DIAGNOSIS — Z3A.32 32 WEEKS GESTATION OF PREGNANCY: ICD-10-CM

## 2019-03-05 DIAGNOSIS — O99.213 OBESITY AFFECTING PREGNANCY IN THIRD TRIMESTER: Primary | ICD-10-CM

## 2019-03-05 DIAGNOSIS — Z36.89 ENCOUNTER FOR ULTRASOUND TO CHECK FETAL GROWTH: ICD-10-CM

## 2019-03-05 PROCEDURE — 99212 OFFICE O/P EST SF 10 MIN: CPT | Performed by: OBSTETRICS & GYNECOLOGY

## 2019-03-05 PROCEDURE — 76816 OB US FOLLOW-UP PER FETUS: CPT | Performed by: OBSTETRICS & GYNECOLOGY

## 2019-03-05 NOTE — PATIENT INSTRUCTIONS
Thank you for choosing [facility] for your  care today  If you have any questions about your ultrasound or care, please do not hesitate to contact us or your primary obstetrician  At this time, no additional ultrasounds are advised through the  center, however, if your doctors would like you to have any additional ultrasounds, they will let us know  Consider support hosiery; compression stockings (choose the lowest grade); PITER stockings (thromboembolic disease stockings) in my opinion were the easiest to get on

## 2019-03-05 NOTE — PROGRESS NOTES
[address] [facility] [place]: Ms Rashida Krueger was seen today at 32w1d for fetal growth assessment ultrasound  See ultrasound report under "OB Procedures" tab  Please don't hesitate to contact our office with any concerns or questions    Trino Leiva MD

## 2019-03-07 ENCOUNTER — ROUTINE PRENATAL (OUTPATIENT)
Dept: OBGYN CLINIC | Facility: CLINIC | Age: 24
End: 2019-03-07

## 2019-03-07 VITALS — BODY MASS INDEX: 35.33 KG/M2 | SYSTOLIC BLOOD PRESSURE: 104 MMHG | WEIGHT: 205.8 LBS | DIASTOLIC BLOOD PRESSURE: 60 MMHG

## 2019-03-07 DIAGNOSIS — Z3A.32 32 WEEKS GESTATION OF PREGNANCY: ICD-10-CM

## 2019-03-07 DIAGNOSIS — Z34.83 ENCOUNTER FOR SUPERVISION OF OTHER NORMAL PREGNANCY IN THIRD TRIMESTER: Primary | ICD-10-CM

## 2019-03-07 PROCEDURE — PNV: Performed by: NURSE PRACTITIONER

## 2019-03-07 NOTE — ASSESSMENT & PLAN NOTE
Feeling well  Denies OB complaints  Good fetal movement  Denies contractions, cramping, leakage of fluid or vaginal bleeding  S/p Rhogam  Declined Tdap d/t previous reaction as a child  Had 32 week growth scan 2 days ago-WNL  No further ultrasound recommended by MFM at this time  Discussed details of checking in appointment at hospital and prenatal classes  Reviewed  labor precautions, reasons to call, and FKCs  RTO in 2 weeks

## 2019-03-07 NOTE — PROGRESS NOTES
O neg, had Rhogam  Declines flu shot, decided to not get Tdap due to previous reaction  Has breast pump order/check in card  Feeling well

## 2019-03-07 NOTE — PROGRESS NOTES
Problem List Items Addressed This Visit        Other    Encounter for supervision of normal pregnancy in third trimester - Primary     Feeling well  Denies OB complaints  Good fetal movement  Denies contractions, cramping, leakage of fluid or vaginal bleeding  S/p Rhogam  Declined Tdap d/t previous reaction as a child  Had 32 week growth scan 2 days ago-WNL  No further ultrasound recommended by MFM at this time  Discussed details of checking in appointment at hospital and prenatal classes  Reviewed  labor precautions, reasons to call, and FKCs  RTO in 2 weeks             32 weeks gestation of pregnancy

## 2019-03-21 ENCOUNTER — ROUTINE PRENATAL (OUTPATIENT)
Dept: OBGYN CLINIC | Facility: CLINIC | Age: 24
End: 2019-03-21

## 2019-03-21 VITALS — WEIGHT: 208.8 LBS | SYSTOLIC BLOOD PRESSURE: 118 MMHG | DIASTOLIC BLOOD PRESSURE: 72 MMHG | BODY MASS INDEX: 35.84 KG/M2

## 2019-03-21 DIAGNOSIS — Z34.83 ENCOUNTER FOR SUPERVISION OF OTHER NORMAL PREGNANCY IN THIRD TRIMESTER: Primary | ICD-10-CM

## 2019-03-21 PROCEDURE — PNV: Performed by: PHYSICIAN ASSISTANT

## 2019-03-21 NOTE — PROGRESS NOTES
Patient is doing well; says she has been feeling light headed the past two days  GFM  Refusing FLU and TDAP  Perineal massage paper given

## 2019-03-21 NOTE — PROGRESS NOTES
Patient w/o complaints  (+) good fetal movement, denies any bleeding, fluid leakage or ctx    Problem List Items Addressed This Visit        Other    Encounter for supervision of normal pregnancy in third trimester - Primary     RTO 2 wks  GBS next visit  Declines tdap  Given perineal massage paper  Reviewed PTL precautions, fetal kick counts and reasons to call

## 2019-03-21 NOTE — ASSESSMENT & PLAN NOTE
RTO 2 wks  GBS next visit  Declines tdap  Given perineal massage paper  Reviewed PTL precautions, fetal kick counts and reasons to call

## 2019-04-04 ENCOUNTER — ROUTINE PRENATAL (OUTPATIENT)
Dept: OBGYN CLINIC | Facility: CLINIC | Age: 24
End: 2019-04-04

## 2019-04-04 VITALS — BODY MASS INDEX: 36.05 KG/M2 | WEIGHT: 210 LBS | SYSTOLIC BLOOD PRESSURE: 120 MMHG | DIASTOLIC BLOOD PRESSURE: 60 MMHG

## 2019-04-04 DIAGNOSIS — Z34.83 ENCOUNTER FOR SUPERVISION OF OTHER NORMAL PREGNANCY IN THIRD TRIMESTER: ICD-10-CM

## 2019-04-04 DIAGNOSIS — Z34.93 PRENATAL CARE IN THIRD TRIMESTER: Primary | ICD-10-CM

## 2019-04-04 PROCEDURE — PNV: Performed by: PHYSICIAN ASSISTANT

## 2019-04-04 PROCEDURE — 87653 STREP B DNA AMP PROBE: CPT | Performed by: PHYSICIAN ASSISTANT

## 2019-04-06 LAB — GP B STREP DNA SPEC QL NAA+PROBE: NORMAL

## 2019-04-11 ENCOUNTER — ROUTINE PRENATAL (OUTPATIENT)
Dept: OBGYN CLINIC | Facility: MEDICAL CENTER | Age: 24
End: 2019-04-11

## 2019-04-11 VITALS
WEIGHT: 210.6 LBS | HEIGHT: 64 IN | DIASTOLIC BLOOD PRESSURE: 68 MMHG | RESPIRATION RATE: 14 BRPM | SYSTOLIC BLOOD PRESSURE: 110 MMHG | BODY MASS INDEX: 35.96 KG/M2

## 2019-04-11 DIAGNOSIS — Z3A.37 37 WEEKS GESTATION OF PREGNANCY: Primary | ICD-10-CM

## 2019-04-11 PROCEDURE — PNV: Performed by: OBSTETRICS & GYNECOLOGY

## 2019-04-16 ENCOUNTER — ROUTINE PRENATAL (OUTPATIENT)
Dept: OBGYN CLINIC | Facility: CLINIC | Age: 24
End: 2019-04-16

## 2019-04-16 VITALS — SYSTOLIC BLOOD PRESSURE: 108 MMHG | DIASTOLIC BLOOD PRESSURE: 72 MMHG | BODY MASS INDEX: 36.36 KG/M2 | WEIGHT: 211.8 LBS

## 2019-04-16 DIAGNOSIS — Z34.93 THIRD TRIMESTER PREGNANCY: Primary | ICD-10-CM

## 2019-04-16 PROCEDURE — PNV: Performed by: OBSTETRICS & GYNECOLOGY

## 2019-04-16 RX ORDER — LORATADINE 10 MG/1
10 TABLET ORAL DAILY PRN
COMMUNITY
End: 2019-06-14

## 2019-04-23 ENCOUNTER — ROUTINE PRENATAL (OUTPATIENT)
Dept: OBGYN CLINIC | Facility: MEDICAL CENTER | Age: 24
End: 2019-04-23

## 2019-04-23 VITALS — BODY MASS INDEX: 36.56 KG/M2 | DIASTOLIC BLOOD PRESSURE: 72 MMHG | SYSTOLIC BLOOD PRESSURE: 112 MMHG | WEIGHT: 213 LBS

## 2019-04-23 DIAGNOSIS — Z34.93 THIRD TRIMESTER PREGNANCY: Primary | ICD-10-CM

## 2019-04-23 DIAGNOSIS — Z34.02 FIRST PREGNANCY, SECOND TRIMESTER: ICD-10-CM

## 2019-04-23 PROBLEM — Z3A.32 32 WEEKS GESTATION OF PREGNANCY: Status: RESOLVED | Noted: 2019-02-05 | Resolved: 2019-04-23

## 2019-04-23 PROCEDURE — PNV: Performed by: PHYSICIAN ASSISTANT

## 2019-04-25 ENCOUNTER — HOSPITAL ENCOUNTER (OUTPATIENT)
Facility: HOSPITAL | Age: 24
Discharge: HOME | End: 2019-04-25
Attending: OBSTETRICS & GYNECOLOGY | Admitting: OBSTETRICS & GYNECOLOGY
Payer: COMMERCIAL

## 2019-04-25 ENCOUNTER — TELEPHONE (OUTPATIENT)
Dept: OBGYN CLINIC | Facility: CLINIC | Age: 24
End: 2019-04-25

## 2019-04-25 VITALS
WEIGHT: 213 LBS | TEMPERATURE: 99.1 F | RESPIRATION RATE: 18 BRPM | DIASTOLIC BLOOD PRESSURE: 83 MMHG | HEART RATE: 105 BPM | HEIGHT: 64 IN | SYSTOLIC BLOOD PRESSURE: 118 MMHG | BODY MASS INDEX: 36.37 KG/M2 | OXYGEN SATURATION: 97 %

## 2019-04-25 PROBLEM — Z3A.39 39 WEEKS GESTATION OF PREGNANCY: Status: ACTIVE | Noted: 2019-02-05

## 2019-04-25 PROCEDURE — 99213 OFFICE O/P EST LOW 20 MIN: CPT

## 2019-04-25 PROCEDURE — ND001 PR NO DOCUMENTATION: Performed by: OBSTETRICS & GYNECOLOGY

## 2019-04-27 ENCOUNTER — HOSPITAL ENCOUNTER (OUTPATIENT)
Facility: HOSPITAL | Age: 24
Discharge: HOME | End: 2019-04-27
Attending: OBSTETRICS & GYNECOLOGY | Admitting: OBSTETRICS & GYNECOLOGY

## 2019-04-27 ENCOUNTER — ANESTHESIA (INPATIENT)
Dept: LABOR AND DELIVERY | Facility: HOSPITAL | Age: 24
End: 2019-04-27
Payer: COMMERCIAL

## 2019-04-27 ENCOUNTER — HOSPITAL ENCOUNTER (INPATIENT)
Facility: HOSPITAL | Age: 24
LOS: 2 days | Discharge: HOME | End: 2019-04-29
Attending: OBSTETRICS & GYNECOLOGY | Admitting: OBSTETRICS & GYNECOLOGY
Payer: COMMERCIAL

## 2019-04-27 ENCOUNTER — ANESTHESIA EVENT (INPATIENT)
Dept: LABOR AND DELIVERY | Facility: HOSPITAL | Age: 24
End: 2019-04-27
Payer: COMMERCIAL

## 2019-04-27 VITALS
DIASTOLIC BLOOD PRESSURE: 78 MMHG | TEMPERATURE: 98.4 F | SYSTOLIC BLOOD PRESSURE: 120 MMHG | OXYGEN SATURATION: 97 % | RESPIRATION RATE: 18 BRPM | HEART RATE: 92 BPM

## 2019-04-27 DIAGNOSIS — Z3A.39 39 WEEKS GESTATION OF PREGNANCY: Primary | ICD-10-CM

## 2019-04-27 LAB
ABO GROUP BLD: NORMAL
BASE EXCESS BLDCOA CALC-SCNC: -6.9 MMOL/L (ref 3–11)
BLD GP AB SCN SERPL QL: POSITIVE
BLOOD GROUP ANTIBODIES SERPL: NORMAL
ERYTHROCYTE [DISTWIDTH] IN BLOOD BY AUTOMATED COUNT: 14.8 % (ref 11.6–15.1)
HCO3 BLDCOA-SCNC: 22.4 MMOL/L (ref 17.3–27.3)
HCT VFR BLD AUTO: 38 % (ref 34.8–46.1)
HGB BLD-MCNC: 12.7 G/DL (ref 11.5–15.4)
MCH RBC QN AUTO: 28.3 PG (ref 26.8–34.3)
MCHC RBC AUTO-ENTMCNC: 33.4 G/DL (ref 31.4–37.4)
MCV RBC AUTO: 85 FL (ref 82–98)
O2 CT VFR BLDCOA CALC: 4.2 ML/DL
OXYHGB MFR BLDCOA: 20.1 %
PCO2 BLDCOA: 61.8 MM[HG] (ref 30–60)
PH BLDCOA: 7.18 [PH] (ref 7.23–7.43)
PLATELET # BLD AUTO: 215 THOUSANDS/UL (ref 149–390)
PMV BLD AUTO: 9.2 FL (ref 8.9–12.7)
PO2 BLDCOA: 15.6 MM HG (ref 5–25)
RBC # BLD AUTO: 4.49 MILLION/UL (ref 3.81–5.12)
RH BLD: NEGATIVE
SPECIMEN EXPIRATION DATE: NORMAL
WBC # BLD AUTO: 18.45 THOUSAND/UL (ref 4.31–10.16)

## 2019-04-27 PROCEDURE — 86900 BLOOD TYPING SEROLOGIC ABO: CPT | Performed by: OBSTETRICS & GYNECOLOGY

## 2019-04-27 PROCEDURE — 86870 RBC ANTIBODY IDENTIFICATION: CPT | Performed by: OBSTETRICS & GYNECOLOGY

## 2019-04-27 PROCEDURE — 0HQ9XZZ REPAIR PERINEUM SKIN, EXTERNAL APPROACH: ICD-10-PCS | Performed by: OBSTETRICS & GYNECOLOGY

## 2019-04-27 PROCEDURE — 59400 OBSTETRICAL CARE: CPT | Performed by: OBSTETRICS & GYNECOLOGY

## 2019-04-27 PROCEDURE — NC001 PR NO CHARGE: Performed by: OBSTETRICS & GYNECOLOGY

## 2019-04-27 PROCEDURE — 85027 COMPLETE CBC AUTOMATED: CPT | Performed by: OBSTETRICS & GYNECOLOGY

## 2019-04-27 PROCEDURE — 82805 BLOOD GASES W/O2 SATURATION: CPT | Performed by: OBSTETRICS & GYNECOLOGY

## 2019-04-27 PROCEDURE — 99214 OFFICE O/P EST MOD 30 MIN: CPT

## 2019-04-27 PROCEDURE — 86901 BLOOD TYPING SEROLOGIC RH(D): CPT | Performed by: OBSTETRICS & GYNECOLOGY

## 2019-04-27 PROCEDURE — 86850 RBC ANTIBODY SCREEN: CPT | Performed by: OBSTETRICS & GYNECOLOGY

## 2019-04-27 PROCEDURE — 4A1HXCZ MONITORING OF PRODUCTS OF CONCEPTION, CARDIAC RATE, EXTERNAL APPROACH: ICD-10-PCS | Performed by: OBSTETRICS & GYNECOLOGY

## 2019-04-27 PROCEDURE — 10907ZC DRAINAGE OF AMNIOTIC FLUID, THERAPEUTIC FROM PRODUCTS OF CONCEPTION, VIA NATURAL OR ARTIFICIAL OPENING: ICD-10-PCS | Performed by: OBSTETRICS & GYNECOLOGY

## 2019-04-27 PROCEDURE — 86592 SYPHILIS TEST NON-TREP QUAL: CPT | Performed by: OBSTETRICS & GYNECOLOGY

## 2019-04-27 RX ORDER — ACETAMINOPHEN 325 MG/1
650 TABLET ORAL EVERY 6 HOURS PRN
Status: DISCONTINUED | OUTPATIENT
Start: 2019-04-27 | End: 2019-04-30 | Stop reason: HOSPADM

## 2019-04-27 RX ORDER — ONDANSETRON 2 MG/ML
4 INJECTION INTRAMUSCULAR; INTRAVENOUS EVERY 8 HOURS PRN
Status: DISCONTINUED | OUTPATIENT
Start: 2019-04-27 | End: 2019-04-30 | Stop reason: HOSPADM

## 2019-04-27 RX ORDER — ONDANSETRON 2 MG/ML
4 INJECTION INTRAMUSCULAR; INTRAVENOUS EVERY 6 HOURS PRN
Status: DISCONTINUED | OUTPATIENT
Start: 2019-04-27 | End: 2019-04-27

## 2019-04-27 RX ORDER — IBUPROFEN 600 MG/1
600 TABLET ORAL EVERY 6 HOURS PRN
Status: DISCONTINUED | OUTPATIENT
Start: 2019-04-27 | End: 2019-04-30 | Stop reason: HOSPADM

## 2019-04-27 RX ORDER — OXYCODONE HYDROCHLORIDE AND ACETAMINOPHEN 5; 325 MG/1; MG/1
2 TABLET ORAL EVERY 4 HOURS PRN
Status: DISCONTINUED | OUTPATIENT
Start: 2019-04-27 | End: 2019-04-30 | Stop reason: HOSPADM

## 2019-04-27 RX ORDER — DIAPER,BRIEF,INFANT-TODD,DISP
1 EACH MISCELLANEOUS AS NEEDED
Status: DISCONTINUED | OUTPATIENT
Start: 2019-04-27 | End: 2019-04-30 | Stop reason: HOSPADM

## 2019-04-27 RX ORDER — LIDOCAINE HYDROCHLORIDE AND EPINEPHRINE 15; 5 MG/ML; UG/ML
INJECTION, SOLUTION EPIDURAL
Status: COMPLETED | OUTPATIENT
Start: 2019-04-27 | End: 2019-04-27

## 2019-04-27 RX ORDER — SODIUM CHLORIDE, SODIUM LACTATE, POTASSIUM CHLORIDE, CALCIUM CHLORIDE 600; 310; 30; 20 MG/100ML; MG/100ML; MG/100ML; MG/100ML
125 INJECTION, SOLUTION INTRAVENOUS CONTINUOUS
Status: DISCONTINUED | OUTPATIENT
Start: 2019-04-27 | End: 2019-04-27

## 2019-04-27 RX ORDER — OXYTOCIN/RINGER'S LACTATE 30/500 ML
PLASTIC BAG, INJECTION (ML) INTRAVENOUS
Status: COMPLETED
Start: 2019-04-27 | End: 2019-04-27

## 2019-04-27 RX ORDER — DOCUSATE SODIUM 100 MG/1
100 CAPSULE, LIQUID FILLED ORAL 2 TIMES DAILY
Status: DISCONTINUED | OUTPATIENT
Start: 2019-04-27 | End: 2019-04-30 | Stop reason: HOSPADM

## 2019-04-27 RX ORDER — OXYCODONE HYDROCHLORIDE AND ACETAMINOPHEN 5; 325 MG/1; MG/1
1 TABLET ORAL EVERY 4 HOURS PRN
Status: DISCONTINUED | OUTPATIENT
Start: 2019-04-27 | End: 2019-04-30 | Stop reason: HOSPADM

## 2019-04-27 RX ORDER — SIMETHICONE 80 MG
80 TABLET,CHEWABLE ORAL 4 TIMES DAILY PRN
Status: DISCONTINUED | OUTPATIENT
Start: 2019-04-27 | End: 2019-04-30 | Stop reason: HOSPADM

## 2019-04-27 RX ORDER — DIPHENHYDRAMINE HYDROCHLORIDE 50 MG/ML
25 INJECTION INTRAMUSCULAR; INTRAVENOUS EVERY 6 HOURS PRN
Status: DISCONTINUED | OUTPATIENT
Start: 2019-04-27 | End: 2019-04-30 | Stop reason: HOSPADM

## 2019-04-27 RX ORDER — CALCIUM CARBONATE 200(500)MG
1000 TABLET,CHEWABLE ORAL DAILY PRN
Status: DISCONTINUED | OUTPATIENT
Start: 2019-04-27 | End: 2019-04-30 | Stop reason: HOSPADM

## 2019-04-27 RX ORDER — OXYTOCIN/RINGER'S LACTATE 30/500 ML
250 PLASTIC BAG, INJECTION (ML) INTRAVENOUS
Status: ACTIVE | OUTPATIENT
Start: 2019-04-27 | End: 2019-04-27

## 2019-04-27 RX ADMIN — SODIUM CHLORIDE, SODIUM LACTATE, POTASSIUM CHLORIDE, AND CALCIUM CHLORIDE 999 ML/HR: .6; .31; .03; .02 INJECTION, SOLUTION INTRAVENOUS at 14:45

## 2019-04-27 RX ADMIN — Medication 250 UNITS: at 20:33

## 2019-04-27 RX ADMIN — BENZOCAINE AND LEVOMENTHOL: 200; 5 SPRAY TOPICAL at 21:54

## 2019-04-27 RX ADMIN — ROPIVACAINE HYDROCHLORIDE: 2 INJECTION, SOLUTION EPIDURAL; INFILTRATION at 16:14

## 2019-04-27 RX ADMIN — HYDROCORTISONE 1 APPLICATION: 1 CREAM TOPICAL at 21:54

## 2019-04-27 RX ADMIN — WITCH HAZEL 1 PAD: 500 SOLUTION RECTAL; TOPICAL at 21:54

## 2019-04-27 RX ADMIN — SODIUM CHLORIDE, SODIUM LACTATE, POTASSIUM CHLORIDE, AND CALCIUM CHLORIDE 125 ML/HR: .6; .31; .03; .02 INJECTION, SOLUTION INTRAVENOUS at 16:12

## 2019-04-27 RX ADMIN — LIDOCAINE HYDROCHLORIDE AND EPINEPHRINE 5 ML: 15; 5 INJECTION, SOLUTION EPIDURAL at 15:56

## 2019-04-27 RX ADMIN — DOCUSATE SODIUM 100 MG: 100 CAPSULE, LIQUID FILLED ORAL at 21:55

## 2019-04-27 RX ADMIN — CEFOXITIN SODIUM 2000 MG: 1 POWDER, FOR SOLUTION INTRAVENOUS at 21:55

## 2019-04-28 PROCEDURE — 99024 POSTOP FOLLOW-UP VISIT: CPT | Performed by: OBSTETRICS & GYNECOLOGY

## 2019-04-28 PROCEDURE — 86901 BLOOD TYPING SEROLOGIC RH(D): CPT | Performed by: OBSTETRICS & GYNECOLOGY

## 2019-04-28 PROCEDURE — 85461 HEMOGLOBIN FETAL: CPT | Performed by: OBSTETRICS & GYNECOLOGY

## 2019-04-28 PROCEDURE — 86850 RBC ANTIBODY SCREEN: CPT | Performed by: OBSTETRICS & GYNECOLOGY

## 2019-04-28 PROCEDURE — 86900 BLOOD TYPING SEROLOGIC ABO: CPT | Performed by: OBSTETRICS & GYNECOLOGY

## 2019-04-28 RX ADMIN — IBUPROFEN 600 MG: 600 TABLET ORAL at 08:58

## 2019-04-28 RX ADMIN — HUMAN RHO(D) IMMUNE GLOBULIN 300 MCG: 300 INJECTION, SOLUTION INTRAMUSCULAR at 19:55

## 2019-04-28 RX ADMIN — DOCUSATE SODIUM 100 MG: 100 CAPSULE, LIQUID FILLED ORAL at 18:38

## 2019-04-28 RX ADMIN — DOCUSATE SODIUM 100 MG: 100 CAPSULE, LIQUID FILLED ORAL at 08:58

## 2019-04-28 RX ADMIN — IBUPROFEN 600 MG: 600 TABLET ORAL at 23:58

## 2019-04-28 RX ADMIN — IBUPROFEN 600 MG: 600 TABLET ORAL at 03:40

## 2019-04-29 VITALS
TEMPERATURE: 98.4 F | SYSTOLIC BLOOD PRESSURE: 125 MMHG | OXYGEN SATURATION: 99 % | RESPIRATION RATE: 18 BRPM | WEIGHT: 212.96 LBS | HEART RATE: 96 BPM | HEIGHT: 64 IN | DIASTOLIC BLOOD PRESSURE: 81 MMHG | BODY MASS INDEX: 36.36 KG/M2

## 2019-04-29 LAB
ABO GROUP BLD: NORMAL
BLD GP AB SCN SERPL QL: POSITIVE
FETAL CELL SCN BLD QL ROSETTE: NEGATIVE
RH BLD: NEGATIVE
RPR SER QL: NORMAL

## 2019-04-29 PROCEDURE — 99024 POSTOP FOLLOW-UP VISIT: CPT | Performed by: OBSTETRICS & GYNECOLOGY

## 2019-04-29 RX ORDER — DOCUSATE SODIUM 100 MG/1
100 CAPSULE, LIQUID FILLED ORAL 2 TIMES DAILY
Qty: 10 CAPSULE | Refills: 0
Start: 2019-04-29

## 2019-04-29 RX ORDER — ACETAMINOPHEN 325 MG/1
650 TABLET ORAL EVERY 6 HOURS PRN
Qty: 30 TABLET | Refills: 0
Start: 2019-04-29

## 2019-04-29 RX ORDER — IBUPROFEN 600 MG/1
600 TABLET ORAL EVERY 6 HOURS PRN
Qty: 30 TABLET | Refills: 0
Start: 2019-04-29 | End: 2019-06-14

## 2019-04-29 RX ORDER — DIAPER,BRIEF,INFANT-TODD,DISP
1 EACH MISCELLANEOUS AS NEEDED
Qty: 30 G | Refills: 0
Start: 2019-04-29

## 2019-04-29 RX ADMIN — HYDROCORTISONE 1 APPLICATION: 1 CREAM TOPICAL at 17:28

## 2019-04-29 RX ADMIN — DOCUSATE SODIUM 100 MG: 100 CAPSULE, LIQUID FILLED ORAL at 17:28

## 2019-04-29 RX ADMIN — BENZOCAINE AND LEVOMENTHOL: 200; 5 SPRAY TOPICAL at 17:28

## 2019-04-29 RX ADMIN — IBUPROFEN 600 MG: 600 TABLET ORAL at 15:37

## 2019-04-29 RX ADMIN — WITCH HAZEL 1 PAD: 500 SOLUTION RECTAL; TOPICAL at 17:28

## 2019-04-29 RX ADMIN — DOCUSATE SODIUM 100 MG: 100 CAPSULE, LIQUID FILLED ORAL at 09:40

## 2019-05-06 LAB — PLACENTA IN STORAGE: NORMAL

## 2019-05-23 ENCOUNTER — POSTPARTUM VISIT (OUTPATIENT)
Dept: OBGYN CLINIC | Facility: MEDICAL CENTER | Age: 24
End: 2019-05-23

## 2019-05-23 VITALS — WEIGHT: 195 LBS | DIASTOLIC BLOOD PRESSURE: 82 MMHG | SYSTOLIC BLOOD PRESSURE: 126 MMHG | BODY MASS INDEX: 33.47 KG/M2

## 2019-05-23 PROBLEM — Z3A.39 39 WEEKS GESTATION OF PREGNANCY: Status: RESOLVED | Noted: 2019-02-05 | Resolved: 2019-05-23

## 2019-05-23 PROBLEM — R11.15 CYCLIC VOMITING SYNDROME: Status: RESOLVED | Noted: 2018-10-29 | Resolved: 2019-05-23

## 2019-05-23 PROBLEM — O99.210 OBESITY AFFECTING PREGNANCY: Status: RESOLVED | Noted: 2019-01-03 | Resolved: 2019-05-23

## 2019-05-23 PROBLEM — Z67.91 RH NEGATIVE STATUS DURING PREGNANCY IN THIRD TRIMESTER: Status: RESOLVED | Noted: 2019-02-05 | Resolved: 2019-05-23

## 2019-05-23 PROBLEM — O26.893 RH NEGATIVE STATUS DURING PREGNANCY IN THIRD TRIMESTER: Status: RESOLVED | Noted: 2019-02-05 | Resolved: 2019-05-23

## 2019-05-23 PROBLEM — Z34.93 ENCOUNTER FOR SUPERVISION OF NORMAL PREGNANCY IN THIRD TRIMESTER: Status: RESOLVED | Noted: 2019-01-03 | Resolved: 2019-05-23

## 2019-05-23 PROCEDURE — 99024 POSTOP FOLLOW-UP VISIT: CPT | Performed by: PHYSICIAN ASSISTANT

## 2019-06-14 ENCOUNTER — OFFICE VISIT (OUTPATIENT)
Dept: OBGYN CLINIC | Facility: CLINIC | Age: 24
End: 2019-06-14
Payer: COMMERCIAL

## 2019-06-14 VITALS — WEIGHT: 193.2 LBS | DIASTOLIC BLOOD PRESSURE: 66 MMHG | SYSTOLIC BLOOD PRESSURE: 102 MMHG | BODY MASS INDEX: 33.16 KG/M2

## 2019-06-14 DIAGNOSIS — O92.79 CLOGGED DUCT, POSTPARTUM: Primary | ICD-10-CM

## 2019-06-14 PROCEDURE — 99213 OFFICE O/P EST LOW 20 MIN: CPT | Performed by: NURSE PRACTITIONER

## 2019-06-18 PROBLEM — O92.79 CLOGGED DUCT, POSTPARTUM: Status: ACTIVE | Noted: 2019-06-18

## 2019-07-13 RX ORDER — CEPHALEXIN 500 MG/1
500 CAPSULE ORAL EVERY 6 HOURS SCHEDULED
Qty: 28 CAPSULE | Refills: 0 | Status: SHIPPED | OUTPATIENT
Start: 2019-07-13 | End: 2019-07-20

## 2019-07-13 RX ORDER — CEPHALEXIN 500 MG/1
500 CAPSULE ORAL EVERY 6 HOURS SCHEDULED
Qty: 28 CAPSULE | Refills: 0 | Status: SHIPPED | OUTPATIENT
Start: 2019-07-13 | End: 2019-07-13 | Stop reason: SDUPTHER

## 2019-07-14 DIAGNOSIS — N61.0 MASTITIS: Primary | ICD-10-CM

## 2019-07-14 RX ORDER — CLINDAMYCIN HYDROCHLORIDE 300 MG/1
300 CAPSULE ORAL 4 TIMES DAILY
Qty: 40 CAPSULE | Refills: 0 | Status: SHIPPED | OUTPATIENT
Start: 2019-07-14 | End: 2019-07-24

## 2019-07-14 NOTE — PROGRESS NOTES
STP on call; has breast pain, fever, and aches  PCN reaction as a baby  Keflex rx'd  Instructions and precautions reviewed

## 2020-05-11 ENCOUNTER — APPOINTMENT (OUTPATIENT)
Dept: LAB | Facility: MEDICAL CENTER | Age: 25
End: 2020-05-11
Payer: COMMERCIAL

## 2020-05-11 ENCOUNTER — TELEPHONE (OUTPATIENT)
Dept: OBGYN CLINIC | Facility: CLINIC | Age: 25
End: 2020-05-11

## 2020-05-11 DIAGNOSIS — N30.00 ACUTE CYSTITIS WITHOUT HEMATURIA: Primary | ICD-10-CM

## 2020-05-11 DIAGNOSIS — N30.00 ACUTE CYSTITIS WITHOUT HEMATURIA: ICD-10-CM

## 2020-05-11 DIAGNOSIS — R30.0 DYSURIA: Primary | ICD-10-CM

## 2020-05-11 LAB
BACTERIA UR QL AUTO: NORMAL /HPF
BILIRUB UR QL STRIP: NEGATIVE
CLARITY UR: CLEAR
COLOR UR: YELLOW
GLUCOSE UR STRIP-MCNC: NEGATIVE MG/DL
HGB UR QL STRIP.AUTO: NEGATIVE
HYALINE CASTS #/AREA URNS LPF: NORMAL /LPF
KETONES UR STRIP-MCNC: NEGATIVE MG/DL
LEUKOCYTE ESTERASE UR QL STRIP: NEGATIVE
NITRITE UR QL STRIP: NEGATIVE
NON-SQ EPI CELLS URNS QL MICRO: NORMAL /HPF
PH UR STRIP.AUTO: 6 [PH]
PROT UR STRIP-MCNC: NEGATIVE MG/DL
RBC #/AREA URNS AUTO: NORMAL /HPF
SP GR UR STRIP.AUTO: 1.02 (ref 1–1.03)
UROBILINOGEN UR QL STRIP.AUTO: 0.2 E.U./DL
WBC #/AREA URNS AUTO: NORMAL /HPF

## 2020-05-11 PROCEDURE — 81001 URINALYSIS AUTO W/SCOPE: CPT

## 2020-05-11 PROCEDURE — 87086 URINE CULTURE/COLONY COUNT: CPT

## 2020-05-11 RX ORDER — NITROFURANTOIN 25; 75 MG/1; MG/1
100 CAPSULE ORAL 2 TIMES DAILY
Qty: 14 CAPSULE | Refills: 0 | Status: SHIPPED | OUTPATIENT
Start: 2020-05-11 | End: 2020-05-18

## 2020-05-12 LAB — BACTERIA UR CULT: NORMAL

## 2020-05-13 ENCOUNTER — TELEPHONE (OUTPATIENT)
Dept: OBGYN CLINIC | Facility: CLINIC | Age: 25
End: 2020-05-13

## 2020-05-14 ENCOUNTER — OFFICE VISIT (OUTPATIENT)
Dept: OBGYN CLINIC | Facility: MEDICAL CENTER | Age: 25
End: 2020-05-14
Payer: COMMERCIAL

## 2020-05-14 VITALS
WEIGHT: 190 LBS | DIASTOLIC BLOOD PRESSURE: 70 MMHG | BODY MASS INDEX: 32.61 KG/M2 | TEMPERATURE: 98.5 F | SYSTOLIC BLOOD PRESSURE: 124 MMHG

## 2020-05-14 DIAGNOSIS — B37.3 VULVAR CANDIDIASIS: Primary | ICD-10-CM

## 2020-05-14 PROBLEM — B37.31 VULVAR CANDIDIASIS: Status: ACTIVE | Noted: 2020-05-14

## 2020-05-14 PROCEDURE — 99213 OFFICE O/P EST LOW 20 MIN: CPT | Performed by: NURSE PRACTITIONER

## 2020-05-14 RX ORDER — NYSTATIN 100000 U/G
OINTMENT TOPICAL
Qty: 30 G | Refills: 1 | Status: SHIPPED | OUTPATIENT
Start: 2020-05-14

## 2020-05-14 RX ORDER — TRIAMCINOLONE ACETONIDE 5 MG/G
OINTMENT TOPICAL
Qty: 30 G | Refills: 1 | Status: SHIPPED | OUTPATIENT
Start: 2020-05-14

## 2021-03-31 ENCOUNTER — ANNUAL EXAM (OUTPATIENT)
Dept: OBGYN CLINIC | Facility: MEDICAL CENTER | Age: 26
End: 2021-03-31
Payer: COMMERCIAL

## 2021-03-31 VITALS — WEIGHT: 188 LBS | DIASTOLIC BLOOD PRESSURE: 90 MMHG | BODY MASS INDEX: 32.27 KG/M2 | SYSTOLIC BLOOD PRESSURE: 128 MMHG

## 2021-03-31 DIAGNOSIS — F41.9 ANXIETY: ICD-10-CM

## 2021-03-31 DIAGNOSIS — Z12.4 ENCOUNTER FOR PAPANICOLAOU SMEAR FOR CERVICAL CANCER SCREENING: ICD-10-CM

## 2021-03-31 DIAGNOSIS — Z01.419 ENCOUNTER FOR GYNECOLOGICAL EXAMINATION (GENERAL) (ROUTINE) WITHOUT ABNORMAL FINDINGS: Primary | ICD-10-CM

## 2021-03-31 PROBLEM — B37.31 VULVAR CANDIDIASIS: Status: RESOLVED | Noted: 2020-05-14 | Resolved: 2021-03-31

## 2021-03-31 PROBLEM — B37.3 VULVAR CANDIDIASIS: Status: RESOLVED | Noted: 2020-05-14 | Resolved: 2021-03-31

## 2021-03-31 PROBLEM — O92.79 CLOGGED DUCT, POSTPARTUM: Status: RESOLVED | Noted: 2019-06-18 | Resolved: 2021-03-31

## 2021-03-31 PROCEDURE — G0145 SCR C/V CYTO,THINLAYER,RESCR: HCPCS | Performed by: NURSE PRACTITIONER

## 2021-03-31 PROCEDURE — S0612 ANNUAL GYNECOLOGICAL EXAMINA: HCPCS | Performed by: NURSE PRACTITIONER

## 2021-03-31 NOTE — ASSESSMENT & PLAN NOTE
Denies depression and no SI/HI/psychotic features  Reviewed non-pharm strategies for anxiety reduction  Also reviewed med management options  She expresses concerns for med use and risk of developing dependence - reassurance provided that daily antidepressant/anxiety med such as SSRI would be safe   She plans to f/u with PCP

## 2021-03-31 NOTE — ASSESSMENT & PLAN NOTE
Normal findings on routine annual gyn exam  Recommended monthly SBE, annual CBE  Reviewed ASCCP guidelines and pap was collected today  STI testing was offered and declined at this time  The patient likes current contraceptive measure and desires to continue (condoms)  Reviewed diet/activity recommendations and reasons to call  F/u in one year for routine annual gyn exam or sooner PRN       F/u with vulvar ulcer when present - she agrees

## 2021-03-31 NOTE — PROGRESS NOTES
Assessment/Plan:    Anxiety  Denies depression and no SI/HI/psychotic features  Reviewed non-pharm strategies for anxiety reduction  Also reviewed med management options  She expresses concerns for med use and risk of developing dependence - reassurance provided that daily antidepressant/anxiety med such as SSRI would be safe  She plans to f/u with PCP     Encounter for gynecological examination (general) (routine) without abnormal findings  Normal findings on routine annual gyn exam  Recommended monthly SBE, annual CBE  Reviewed ASCCP guidelines and pap was collected today  STI testing was offered and declined at this time  The patient likes current contraceptive measure and desires to continue (condoms)  Reviewed diet/activity recommendations and reasons to call  F/u in one year for routine annual gyn exam or sooner PRN  F/u with vulvar ulcer when present - she agrees       Diagnoses and all orders for this visit:    Encounter for gynecological examination (general) (routine) without abnormal findings    Encounter for Papanicolaou smear for cervical cancer screening  -     Liquid-based pap, screening    Anxiety          Subjective:      Patient ID: Dona Cobian is a 31 y o  female  This patient presents for routine annual gyn exam    Medically stable  Feels more anxious recently due to covid and spouse traveling  Difficulty sleeping, feels palpitations sometimes  She had normal cardiac work up  Denies depression/SI/HI  Has a recurrent sore on left majora  Not present today  Looks like an ulcer  Somewhat tender, no drainage/bleeding  Denies h/o STI for self or partner   Breastfeeding 1 y o once/day at bedtime   She denies acute gyn complaints  Menses are regular and light to mod  She denies pelvic pain, breast concerns, abnormal discharge, bowel/bladder dysfunction   and monogamous  She denies STI concerns  Condoms for contraception             The following portions of the patient's history were reviewed and updated as appropriate: allergies, current medications, past family history, past medical history, past social history, past surgical history and problem list     Review of Systems   Constitutional: Negative  Respiratory: Negative  Cardiovascular: Negative  Gastrointestinal: Negative  Genitourinary: Negative  Musculoskeletal: Negative  Skin: Negative  Neurological: Negative  Psychiatric/Behavioral: The patient is nervous/anxious  Objective:      /90   Wt 85 3 kg (188 lb)   LMP 03/19/2021   BMI 32 27 kg/m²          Physical Exam  Constitutional:       Appearance: She is well-developed  HENT:      Head: Normocephalic and atraumatic  Eyes:      Pupils: Pupils are equal, round, and reactive to light  Neck:      Musculoskeletal: Normal range of motion and neck supple  Thyroid: No thyromegaly  Cardiovascular:      Rate and Rhythm: Normal rate and regular rhythm  Heart sounds: Normal heart sounds  Pulmonary:      Effort: Pulmonary effort is normal  No respiratory distress  Breath sounds: Normal breath sounds  No wheezing or rales  Chest:      Chest wall: No mass, deformity or tenderness  Breasts: Breasts are symmetrical          Right: No inverted nipple, mass, nipple discharge, skin change or tenderness  Left: No inverted nipple, mass, nipple discharge, skin change or tenderness  Abdominal:      General: There is no distension  Palpations: Abdomen is soft  There is no hepatomegaly, splenomegaly or mass  Tenderness: There is no abdominal tenderness  There is no guarding or rebound  Genitourinary:     Labia:         Right: No rash, tenderness, lesion or injury  Left: No rash, tenderness, lesion or injury  Vagina: Normal  No foreign body  No vaginal discharge, erythema, tenderness or bleeding  Cervix: No cervical motion tenderness, discharge or friability        Uterus: Normal        Adnexa: Right: No mass, tenderness or fullness  Left: No mass, tenderness or fullness  Rectum: Normal        Musculoskeletal: Normal range of motion  Lymphadenopathy:      Cervical: No cervical adenopathy  Skin:     General: Skin is warm and dry  Findings: No rash  Nails: There is no clubbing  Neurological:      Mental Status: She is alert and oriented to person, place, and time  Cranial Nerves: No cranial nerve deficit  Psychiatric:         Speech: Speech normal          Behavior: Behavior normal          Thought Content:  Thought content normal          Judgment: Judgment normal

## 2021-04-08 LAB
LAB AP GYN PRIMARY INTERPRETATION: NORMAL
Lab: NORMAL

## 2022-08-22 ENCOUNTER — ULTRASOUND (OUTPATIENT)
Dept: OBGYN CLINIC | Facility: MEDICAL CENTER | Age: 27
End: 2022-08-22
Payer: COMMERCIAL

## 2022-08-22 VITALS
SYSTOLIC BLOOD PRESSURE: 124 MMHG | DIASTOLIC BLOOD PRESSURE: 72 MMHG | BODY MASS INDEX: 32.49 KG/M2 | HEIGHT: 65 IN | WEIGHT: 195 LBS

## 2022-08-22 DIAGNOSIS — Z34.81 PRENATAL CARE, SUBSEQUENT PREGNANCY, FIRST TRIMESTER: Primary | ICD-10-CM

## 2022-08-22 DIAGNOSIS — O36.80X1 ENCOUNTER TO DETERMINE FETAL VIABILITY OF PREGNANCY, FETUS 1: ICD-10-CM

## 2022-08-22 PROCEDURE — 76817 TRANSVAGINAL US OBSTETRIC: CPT | Performed by: STUDENT IN AN ORGANIZED HEALTH CARE EDUCATION/TRAINING PROGRAM

## 2022-08-22 NOTE — PROGRESS NOTES
Single viable DVE=918 BPM  CRL=24 4mm=9w1d  EDC-3/26/2023    UT=88 x 71 x 68 mm  RT OV=24 x 23 x 22 mm  LT OV=15 x 22 x 16 mm

## 2022-08-22 NOTE — PROGRESS NOTES
Here today for a early [address]  This was planned pregnancy, they have been trying on and off for almost a year  Lisandro travels a lot for work weeks at a time   LMP 6/19/22, home pregnancy test was positive  Overall feeling good except nausea, has cyclic vomiting syndrome but has not needed zofran in over a year  Reviewed MFM Referral and routine OB care with [facility]

## 2022-09-06 ENCOUNTER — INITIAL PRENATAL (OUTPATIENT)
Dept: OBGYN CLINIC | Facility: CLINIC | Age: 27
End: 2022-09-06

## 2022-09-06 DIAGNOSIS — Z34.81 PRENATAL CARE, SUBSEQUENT PREGNANCY, FIRST TRIMESTER: Primary | ICD-10-CM

## 2022-09-06 PROCEDURE — OBC: Performed by: STUDENT IN AN ORGANIZED HEALTH CARE EDUCATION/TRAINING PROGRAM

## 2022-09-06 NOTE — PROGRESS NOTES
OB INTAKE INTERVIEW  Patient is 28 y o  who presents for OB intake at 11-2 wks  She is accompanied by: phone interview  The father of her baby Alberto Palmer is involved in the pregnancy and they are    SAB1  Last Menstrual Period: 22  Ultrasound: Measured 9 weeks 1 days on 22  Estimated Date of Delivery: 3/26/2023 via US     Signs/Symptoms of Pregnancy  Current pregnancy symptoms: N & V-has zofran if needed  Constipation no  Headaches no  Cramping/spotting no  PICA cravings no    Diabetes-    If patient has 1 or more, please order early 1 hour GTT  History of GDM no  BMI >35 no  History of PCOS or current metformin use no  History of LGA/macrosomic infant (4000g/9lbs) no    If patient has 2 or more, please order early 1 hour GTT  BMI>30 YES  AMA no  First degree relative with type 2 diabetes no  History of chronic HTN, hyperlipidemia, elevated A1C no  High risk race (, , ,  or ) no    Hypertension- if you answer yes, please order preeclampsia labs (cbc, comprehensive metabolic panel, urine protein creatinine ratio, uric acid)  History of of chronic HTN no  History of gestational HTN no  History of preeclampsia, eclampsia, or HELLP syndrome no  History of diabetes no  History of lupus, autoimmune disease, kidney disease no    Thyroid- if yes order TSH with reflex T4  History of thyroid disease no    Bleeding Disorder or Hx of DVT-patient or first degree relative with history of  Order the following if not done previously     (Factor V, antithrombin III, prothrombin gene mutation, protein C and S Ag, lupus anticoagulant, anticardiolipin, beta-2 glycoprotein)   no    OB/GYN-  History of abnormal pap smear no  History of HPV no  History of Herpes/HSV no  History of other STI (gonorrhea, chlamydia, trich) no  History of prior  YES  History of prior  no  History of  delivery prior to 36 weeks 6 days no  History of blood transfusion no  Ok for blood transfusion yes    Substance screening- if yes outside of tobacco for her or anyone in her home-order urine drug screen  History of tobacco use no  Currently using tobacco no  Currently using alcohol no  Presently using drugs no  Past drug use  no  IV drug use-If yes add Hep C antibody to labs no  Partner drug use no  Parent/Family drug use no    MRSA Screening-   Does the pt have a hx of MRSA? no  If yes- please follow MRSA protocol and obtain a nasal swab for MRSA culture    Immunizations:  Influenza vaccine given this season no  Discussed Tdap vaccine yes-pt has allergy to pertussis  Discussed COVID Vaccine yes    Genetic/MFM-  Do you or your partner have a history of any of the following in yourselves or first degree relatives? Cystic fibrosis no  Spinal muscular atrophy no  Hemoglobinopathy/Sickle Cell/Thalassemia no  Fragile X Intellectual Disability no    If yes, discuss carrier screening and recommend consultation with [facility]/genetic counseling  If no, discuss option for carrier screening and/or genetic testing with Nuchal Ultrasound  Patient interested yes  Appointment at [facility] made yes    Interview education  [facility] Pregnancy Essentials Book reviewed and discussed yes    Nurse/Family Partnership- patient may qualify no; referral placed no    Prenatal lab work scripts yes  Extra labs ordered:  no    The patient has a history now or in prior pregnancy notable for:  -vag/forcep christ,  Hx of 1 SAB,  Pt is RH negative ,  BMI 32 4,  Hx of anxiety,  Hx of cyclic vomiting syndrome- Zofran prn  Pt has allergy to pertussis  Details that I feel the provider should be aware of:  Pt denies receiving a flu vaccine , or Covid vaccines  Discussed importance of receiving  PN1 visit scheduled  The patient was oriented to our practice, reviewed delivering physicians and [facility] for Delivery  All questions were answered  PN phone interview completed    Pt happy with pregnancy  PN bldwk ordered in epic  Encouraged pt to have bldwk drawn prior to PN1 appt scheduled for 9/22/22  [place] [facility]19[place]22 & 11/8/22  Advised pt to call with any further questions/concerns       Interviewed by: Smooth Keane RN, [address] [place] [address]

## 2022-09-09 ENCOUNTER — APPOINTMENT (OUTPATIENT)
Dept: LAB | Facility: MEDICAL CENTER | Age: 27
End: 2022-09-09
Payer: COMMERCIAL

## 2022-09-09 DIAGNOSIS — Z34.81 PRENATAL CARE, SUBSEQUENT PREGNANCY, FIRST TRIMESTER: ICD-10-CM

## 2022-09-09 LAB
ABO GROUP BLD: NORMAL
BACTERIA UR QL AUTO: ABNORMAL /HPF
BASOPHILS # BLD AUTO: 0.02 THOUSANDS/ΜL (ref 0–0.1)
BASOPHILS NFR BLD AUTO: 0 % (ref 0–1)
BILIRUB UR QL STRIP: NEGATIVE
BLD GP AB SCN SERPL QL: NEGATIVE
CLARITY UR: CLEAR
COLOR UR: YELLOW
EOSINOPHIL # BLD AUTO: 0.01 THOUSAND/ΜL (ref 0–0.61)
EOSINOPHIL NFR BLD AUTO: 0 % (ref 0–6)
ERYTHROCYTE [DISTWIDTH] IN BLOOD BY AUTOMATED COUNT: 14 % (ref 11.6–15.1)
GLUCOSE UR STRIP-MCNC: NEGATIVE MG/DL
HBV SURFACE AG SER QL: NORMAL
HCT VFR BLD AUTO: 38.1 % (ref 34.8–46.1)
HCV AB SER QL: NORMAL
HGB BLD-MCNC: 12.8 G/DL (ref 11.5–15.4)
HGB UR QL STRIP.AUTO: NEGATIVE
IMM GRANULOCYTES # BLD AUTO: 0.04 THOUSAND/UL (ref 0–0.2)
IMM GRANULOCYTES NFR BLD AUTO: 0 % (ref 0–2)
KETONES UR STRIP-MCNC: NEGATIVE MG/DL
LEUKOCYTE ESTERASE UR QL STRIP: ABNORMAL
LYMPHOCYTES # BLD AUTO: 1.86 THOUSANDS/ΜL (ref 0.6–4.47)
LYMPHOCYTES NFR BLD AUTO: 19 % (ref 14–44)
MCH RBC QN AUTO: 28.4 PG (ref 26.8–34.3)
MCHC RBC AUTO-ENTMCNC: 33.6 G/DL (ref 31.4–37.4)
MCV RBC AUTO: 85 FL (ref 82–98)
MONOCYTES # BLD AUTO: 0.43 THOUSAND/ΜL (ref 0.17–1.22)
MONOCYTES NFR BLD AUTO: 5 % (ref 4–12)
MUCOUS THREADS UR QL AUTO: ABNORMAL
NEUTROPHILS # BLD AUTO: 7.21 THOUSANDS/ΜL (ref 1.85–7.62)
NEUTS SEG NFR BLD AUTO: 76 % (ref 43–75)
NITRITE UR QL STRIP: NEGATIVE
NON-SQ EPI CELLS URNS QL MICRO: ABNORMAL /HPF
NRBC BLD AUTO-RTO: 0 /100 WBCS
PH UR STRIP.AUTO: 6.5 [PH]
PLATELET # BLD AUTO: 224 THOUSANDS/UL (ref 149–390)
PMV BLD AUTO: 9.4 FL (ref 8.9–12.7)
PROT UR STRIP-MCNC: ABNORMAL MG/DL
RBC # BLD AUTO: 4.5 MILLION/UL (ref 3.81–5.12)
RBC #/AREA URNS AUTO: ABNORMAL /HPF
RH BLD: NEGATIVE
RPR SER QL: NORMAL
RUBV IGG SERPL IA-ACNC: 63.9 IU/ML
SP GR UR STRIP.AUTO: 1.03 (ref 1–1.03)
UROBILINOGEN UR STRIP-ACNC: <2 MG/DL
WBC # BLD AUTO: 9.57 THOUSAND/UL (ref 4.31–10.16)
WBC #/AREA URNS AUTO: ABNORMAL /HPF

## 2022-09-09 PROCEDURE — 36415 COLL VENOUS BLD VENIPUNCTURE: CPT

## 2022-09-09 PROCEDURE — 86803 HEPATITIS C AB TEST: CPT

## 2022-09-09 PROCEDURE — 87086 URINE CULTURE/COLONY COUNT: CPT

## 2022-09-09 PROCEDURE — 81001 URINALYSIS AUTO W/SCOPE: CPT

## 2022-09-09 PROCEDURE — 80081 OBSTETRIC PANEL INC HIV TSTG: CPT

## 2022-09-10 LAB — HIV 1+2 AB+HIV1 P24 AG SERPL QL IA: NORMAL

## 2022-09-11 LAB — BACTERIA UR CULT: NORMAL

## 2022-09-19 ENCOUNTER — ROUTINE PRENATAL (OUTPATIENT)
Dept: PERINATAL CARE | Facility: OTHER | Age: 27
End: 2022-09-19
Payer: COMMERCIAL

## 2022-09-19 VITALS
HEIGHT: 64 IN | WEIGHT: 190.7 LBS | BODY MASS INDEX: 32.56 KG/M2 | DIASTOLIC BLOOD PRESSURE: 68 MMHG | HEART RATE: 106 BPM | SYSTOLIC BLOOD PRESSURE: 110 MMHG

## 2022-09-19 DIAGNOSIS — Z36.82 NUCHAL TRANSLUCENCY OF FETUS ON PRENATAL ULTRASOUND: Primary | ICD-10-CM

## 2022-09-19 DIAGNOSIS — Z34.81 PRENATAL CARE, SUBSEQUENT PREGNANCY, FIRST TRIMESTER: ICD-10-CM

## 2022-09-19 DIAGNOSIS — Z3A.13 13 WEEKS GESTATION OF PREGNANCY: ICD-10-CM

## 2022-09-19 PROCEDURE — 99241 PR OFFICE CONSULTATION NEW/ESTAB PATIENT 15 MIN: CPT | Performed by: OBSTETRICS & GYNECOLOGY

## 2022-09-19 PROCEDURE — 76813 OB US NUCHAL MEAS 1 GEST: CPT | Performed by: OBSTETRICS & GYNECOLOGY

## 2022-09-19 PROCEDURE — 36415 COLL VENOUS BLD VENIPUNCTURE: CPT | Performed by: OBSTETRICS & GYNECOLOGY

## 2022-09-19 NOTE — PROGRESS NOTES
28 y o   at 13w1d with Estimated Date of Delivery: 3/26/23 by LMP 22 consistent w/ 1st trimester US  She reports some mild nausea/vomiting but states this is well controlled at this time  Has zofran but has not needed it  Advised pepcid for reflux symptoms and nausea  She denies bleeding/cramping  Prenatal labs: complete and wnl     Genetic screening: Saw [facility] this week; NIPT collected at [facility]  She is unsure if desires AFP counseled on test and test timing  She will consider for next visit    OB history: hx forcep     GYN history: Last pap smear 3/2021  Denies history of STDs  Past medical history: cyclic vomiting syndrome; Rh negative, migraines    Past surgical history: wisdom teeth    Social history: Denies tobacco, alcohol, or illicit drug use  Family history:   DVT/PE: no  Cancer: MGM lung  Heart disease: MGF  Stroke: no    Physical exam:     Fetal heart tones: 155    Patient appears well and is not in distress  Neck is supple without masses  Breasts are symmetrical without mass, tenderness, nipple discharge, skin changes or adenopathy  Abdomen is soft and nontender without masses  External genitals are normal without lesions or rashes  Vagina is normal without discharge or bleeding  Cervix is normal without discharge or lesion  Uterus is normal for gestational age  Adnexa are normal, nontender, without palpable mass  Discussed as well during this visit was diet, prenatal vitamins, prenatal visits, lab testing, breast feeding, vaccinations, maternal fetal medicine consultations, and lifestyle  ASSESSMENT/PLAN   Problem List Items Addressed This Visit        Digestive    Cyclic vomiting syndrome     Has not needed Zofran recently  Has occasional vomiting   Tolerating meals and hydration  Discussed pepcid for reflux related nausea which she will try            Other    13 weeks gestation of pregnancy - Primary    Relevant Orders    POCT urine dip (Completed)    History of forceps delivery in prior pregnancy, currently pregnant          1 - Gonorrhea and Chlamydia cultures obtained today  2 - Pap smear with HPV co-testing not done today  Pap up to date  3 - Prenatal labs reviewed -- complete and wnl  4 - Genetic screening -- NIPT with MFM; discussed AFP; will consider for next visit  5 - RTO in 4 weeks for routine PN visit    Blue packet given and reviewed     All questions were answered & Abril Vann expressed understanding

## 2022-09-19 NOTE — LETTER
2022     Liliam Phelps, [address]  [address] [facility]  [address] [address]    Patient: Efraín Hernandez   YOB: 1995   Date of Visit: 2022       Dear Dr Joaquín Vivas: Thank you for referring Dennise Melara to me for evaluation  Below are my notes for this consultation  If you have questions, please do not hesitate to call me  I look forward to following your patient along with you  Sincerely,        Jojo Lopez MD        CC: No Recipients  Jojo Lopez MD  2022  3:25 PM  Sign when Signing Visit  OFFICE CONSULT  Referring physician:   Liliam Phelps Md  [address] [facility] ,  [address] [facility]      Dear Dr Joaquín Vivas      Thank you for requesting a  consultation on your patient Ms  Efraín Hernandez for the following indications:  Genetic screening    History  Medications:  Prenatal vitamins  Allergies to medications:  Penicillin causes hives, Reglan causes anxiety and chest pain and an allergy to the Pertussis vaccine  Past medical history:  Pre gravid BMI of 33, Palpitations that occurred prior to pregnancy and for which she had a Holter monitor which showed sinus rhythm during the symptoms she had where she reported chest pain  She noted similar symptoms in beginning of pregnancy which have resolved  Cyclic vomiting syndrome(which she has not had occur in a while), history of migraines, Rh negative, scoliosis  Past surgical history:  [place] tooth extraction  Past obstetrical history:   2015 at 10 days she had a spontaneous miscarriage- last US prior to loss was at 7 weeks  19 at 39 weeks and 5 days she had a 6 pound 7 ounce baby girl born by forceps  Social history:  Denies substance use  First generation family history: Mother has hypertension and a sister has lupus    Ultrasound findings: The ultrasound shows a fetus concordant with dates   The nasal bone and nuchal translucency appears normal  No malformations are seen on today's early ultrasound  The patient was informed of the findings and counseled about the limitations of the exam in detecting all forms of fetal congenital abnormalities  She does not report any vaginal bleeding or uterine cramping or contractions  Specific counseling was provided on the following problems:  1  We discussed the options for genetic screening which include invasive testing on the fetal placenta or on fetal skin cells within the amniotic fluid and compared this to noninvasive testing which includes cell free DNA screening and the sequential screen  We reviewed the risks, the benefits and the limitations of each  In the end patient chose to complete the cell free DNA screen  2  She reports that she expected the nausea and vomiting of pregnancy to have resolved but she continues to feel worse 1st thing in the morning  Recommend she try Pepcid 10 milligram tablets 30 minutes before dinner to see if this is secondary to reflux  She states her symptoms are not the same as her cyclic vomiting symptoms  Future tests recommended:  1  The results of her NIPT will return in 7-10 days and her OB office will order an MSAFP screen at 16-18 weeks to screen for spina bifida  Future ultrasounds ordered today:   1  Fetal Level II ultrasound imaging is recommended at 19-20 weeks' gestation  The face to face time, in addition to time spent discussing ultrasound results, was approximately 15 minutes, greater than 50% of which was spent during counseling and coordination of care      Nicolás Rey MD

## 2022-09-19 NOTE — PROGRESS NOTES
OFFICE CONSULT  Referring physician:   Radha Saeed Md  [address],  [address] [facility]      Dear Dr Roberto Desai      Thank you for requesting a  consultation on your patient Ms Cong Orona for the following indications:  Genetic screening    History  Medications:  Prenatal vitamins  Allergies to medications:  Penicillin causes hives, Reglan causes anxiety and chest pain and an allergy to the Pertussis vaccine  Past medical history:  Pre gravid BMI of 33, Palpitations that occurred prior to pregnancy and for which she had a Holter monitor which showed sinus rhythm during the symptoms she had where she reported chest pain  She noted similar symptoms in beginning of pregnancy which have resolved  Cyclic vomiting syndrome(which she has not had occur in a while), history of migraines, Rh negative, scoliosis  Past surgical history:  [place] tooth extraction  Past obstetrical history:   2015 at 10 days she had a spontaneous miscarriage- last US prior to loss was at 7 weeks  19 at 39 weeks and 5 days she had a 6 pound 7 ounce baby girl born by forceps  Social history:  Denies substance use  First generation family history: Mother has hypertension and a sister has lupus    Ultrasound findings: The ultrasound shows a fetus concordant with dates  The nasal bone and nuchal translucency appears normal  No malformations are seen on today's early ultrasound  The patient was informed of the findings and counseled about the limitations of the exam in detecting all forms of fetal congenital abnormalities  She does not report any vaginal bleeding or uterine cramping or contractions  Specific counseling was provided on the following problems:  1   We discussed the options for genetic screening which include invasive testing on the fetal placenta or on fetal skin cells within the amniotic fluid and compared this to noninvasive testing which includes cell free DNA screening and the sequential screen  We reviewed the risks, the benefits and the limitations of each  In the end patient chose to complete the cell free DNA screen  2  She reports that she expected the nausea and vomiting of pregnancy to have resolved but she continues to feel worse 1st thing in the morning  Recommend she try Pepcid 10 milligram tablets 30 minutes before dinner to see if this is secondary to reflux  She states her symptoms are not the same as her cyclic vomiting symptoms  Future tests recommended:  1  The results of her NIPT will return in 7-10 days and her OB office will order an MSAFP screen at 16-18 weeks to screen for spina bifida  Future ultrasounds ordered today:   1  Fetal Level II ultrasound imaging is recommended at 19-20 weeks' gestation  The face to face time, in addition to time spent discussing ultrasound results, was approximately 15 minutes, greater than 50% of which was spent during counseling and coordination of care      Kylie Leach MD

## 2022-09-19 NOTE — PROGRESS NOTES
Patient chose to have [facility] Non-invasive Prenatal Screen  Patient given brochure and is aware [facility] will contact patients insurance and coordinate coverage  Patient made aware she will need to respond to text message or e-mail from [employer] within 2 business days or testing will be run through insurance  Patient informed text message will come from area code  "415"  Provided [address] [place] [address] # [phone] and web site : [email or web address]     2 vials of blood drawn from right arm, patient tolerated blood draw without difficulty  Specimens labeled with patient identifiers (name, date of birth, specimen collection date), order and specimen was verified with patient, packed and sent via [employer]  Copy of lab order scanned to Epic media  [facility] Fetal [facility] will have results in approximately 7-10 business days and will call patient or notify via [address]  Patient aware viewing lab result online will reveal fetal sex If ordered  Patient verbalized understanding of all instructions and no questions at this time

## 2022-09-22 ENCOUNTER — INITIAL PRENATAL (OUTPATIENT)
Dept: OBGYN CLINIC | Facility: MEDICAL CENTER | Age: 27
End: 2022-09-22

## 2022-09-22 VITALS
WEIGHT: 191.6 LBS | BODY MASS INDEX: 31.92 KG/M2 | SYSTOLIC BLOOD PRESSURE: 110 MMHG | HEIGHT: 65 IN | DIASTOLIC BLOOD PRESSURE: 78 MMHG

## 2022-09-22 DIAGNOSIS — O09.299 HISTORY OF FORCEPS DELIVERY IN PRIOR PREGNANCY, CURRENTLY PREGNANT: ICD-10-CM

## 2022-09-22 DIAGNOSIS — Z3A.13 13 WEEKS GESTATION OF PREGNANCY: Primary | ICD-10-CM

## 2022-09-22 DIAGNOSIS — Z11.3 SCREEN FOR STD (SEXUALLY TRANSMITTED DISEASE): ICD-10-CM

## 2022-09-22 DIAGNOSIS — R11.15 CYCLIC VOMITING SYNDROME: ICD-10-CM

## 2022-09-22 LAB
SL AMB  POCT GLUCOSE, UA: ABNORMAL
SL AMB POCT URINE PROTEIN: ABNORMAL

## 2022-09-22 PROCEDURE — 87491 CHLMYD TRACH DNA AMP PROBE: CPT | Performed by: PHYSICIAN ASSISTANT

## 2022-09-22 PROCEDURE — PNV: Performed by: PHYSICIAN ASSISTANT

## 2022-09-22 PROCEDURE — 87591 N.GONORRHOEAE DNA AMP PROB: CPT | Performed by: PHYSICIAN ASSISTANT

## 2022-09-22 RX ORDER — FAMOTIDINE 20 MG/1
20 TABLET, FILM COATED ORAL 2 TIMES DAILY
COMMUNITY

## 2022-09-22 RX ORDER — ONDANSETRON 4 MG/1
4 TABLET, FILM COATED ORAL EVERY 8 HOURS PRN
COMMUNITY

## 2022-09-22 NOTE — PROGRESS NOTES
PNV  13w4d  [id number]  Patient denies any lof, vb or ctx     Patient urine is trace pro/neg glu  Patient has no concerns today    + n/v

## 2022-09-22 NOTE — ASSESSMENT & PLAN NOTE
Has not needed Zofran recently  Has occasional vomiting   Tolerating meals and hydration  Discussed pepcid for reflux related nausea which she will try

## 2022-09-23 ENCOUNTER — TELEPHONE (OUTPATIENT)
Dept: PERINATAL CARE | Facility: OTHER | Age: 27
End: 2022-09-23

## 2022-09-23 LAB
C TRACH DNA SPEC QL NAA+PROBE: NEGATIVE
N GONORRHOEA DNA SPEC QL NAA+PROBE: NEGATIVE

## 2022-09-23 NOTE — TELEPHONE ENCOUNTER
Patient called our office questioning [employer]'s portal stating "submitted" when blood work was drawn Monday 09/19/2022  Explained the blood work is sent out via  and fed-ex to [place]  Asked patient to call our office Monday (09/26/2022) morning if [employer]'s web portal continues to state submitted  Reviewed with patient once they receive the specimen it will be updated on their portal and should state "processing"  Patient verbalized understanding  If [employer] portal has not received NIPS kit by Monday (09/26/2022) we will bring patient back to our office for a re-draw

## 2022-09-26 ENCOUNTER — CLINICAL SUPPORT (OUTPATIENT)
Dept: PERINATAL CARE | Facility: OTHER | Age: 27
End: 2022-09-26

## 2022-09-26 DIAGNOSIS — Z36.9 ENCOUNTER FOR ANTENATAL SCREENING OF MOTHER: Primary | ICD-10-CM

## 2022-09-26 NOTE — PROGRESS NOTES
Pt called office and stated the [facility] NIPS that she had drawn on 9/19/2022 still has not been received by [facility]  She was scheduled today for a blood re-draw  Patient chose to have [facility] Non-invasive Prenatal Screen  Patient given brochure and is aware [facility] will contact patients insurance and coordinate coverage  Patient made aware she will need to respond to text message or e-mail from [facility] within 2 business days or testing will be run through insurance  Patient informed text message will come from area code  "415"  Provided [address] [place] [address] # [phone] and web site : [email or web address]     2 vials of blood drawn from right arm, patient tolerated blood draw without difficulty  Specimens labeled with patient identifiers (name, date of birth, specimen collection date), order and specimen was verified with patient, packed and sent via [employer]  Copy of lab order scanned to Epic media  Maternal Fetal Medicine will have results in approximately 7-10 business days and will call patient or notify via [address]  Patient aware viewing lab result online will reveal fetal sex If ordered  Patient verbalized understanding of all instructions and no questions at this time           [employer] tracking :  [phone]

## 2022-10-06 ENCOUNTER — TELEPHONE (OUTPATIENT)
Dept: OBGYN CLINIC | Facility: CLINIC | Age: 27
End: 2022-10-06

## 2022-10-06 DIAGNOSIS — Z3A.15 15 WEEKS GESTATION OF PREGNANCY: Primary | ICD-10-CM

## 2022-10-06 NOTE — TELEPHONE ENCOUNTER
Left voice message that MSAFP should be completed between 15-20 weeks gestation as a screening tool for spina bifida  This testing is not accurate before or after this time  Please verify insurance coverage prior to completing  The order was placed in your chart

## 2022-10-18 PROBLEM — Z3A.17 17 WEEKS GESTATION OF PREGNANCY: Status: ACTIVE | Noted: 2022-09-22

## 2022-10-18 NOTE — PROGRESS NOTES
[id number] 17w2d  Pap 03/31/2021 Negative  GC/CT: 9/22/2022 Negative / Negative   PN1 Labs: 9/9/2022  Blood Type: O  Negative  Needs  Rhogam @ 28 weeks   MFM Level 1: 9/19/2022  MFM Level 2:  AFP: Not done   28 Week Labs:  TDap:  Flu:  GBS:   Blue Folder: Given   Yellow Folder:  Ped Referral :  Breast pump:  L&D forms:  Delivery consent:

## 2022-10-18 NOTE — PROGRESS NOTES
Betty Mckinley is here for OB visit at 17w4d weeks accompanied by 2 yo daughter Rosalinda Leyva  TWG - 9 6 oz  Eats small portions frequently  Hx of cyclic vomiting syndrome  Vomited twice this week  Takes zofran as needed  No other health concerns  Denies LOF, vaginal bleeding or contractions  AFP already ordered but refused  Proteinuria- denies urinary symptoms  Adequately hydrated  Urine protein/creat ratio ordered  Trace protein last visit  Declined influenza   plans to vaccine for flu  She will not vaccinate 1 yo Natividad with covid or influenza vaccines  Continue daily PNV  MFM appt 11/8/22  Anxiety is lessened compared to first trimester; negative depression screen  Rhogam at 28 weeks  RTO in 4 weeks

## 2022-10-20 ENCOUNTER — ROUTINE PRENATAL (OUTPATIENT)
Dept: OBGYN CLINIC | Facility: MEDICAL CENTER | Age: 27
End: 2022-10-20

## 2022-10-20 VITALS
DIASTOLIC BLOOD PRESSURE: 78 MMHG | SYSTOLIC BLOOD PRESSURE: 122 MMHG | WEIGHT: 194.4 LBS | HEIGHT: 65 IN | BODY MASS INDEX: 32.39 KG/M2

## 2022-10-20 DIAGNOSIS — Z3A.17 17 WEEKS GESTATION OF PREGNANCY: Primary | ICD-10-CM

## 2022-10-20 DIAGNOSIS — O12.12 PROTEINURIA AFFECTING PREGNANCY IN SECOND TRIMESTER: ICD-10-CM

## 2022-10-20 DIAGNOSIS — F41.9 ANXIETY: ICD-10-CM

## 2022-10-20 DIAGNOSIS — R11.15 CYCLIC VOMITING SYNDROME: ICD-10-CM

## 2022-10-20 LAB
SL AMB  POCT GLUCOSE, UA: NEGATIVE
SL AMB POCT URINE PROTEIN: POSITIVE

## 2022-10-21 ENCOUNTER — APPOINTMENT (OUTPATIENT)
Dept: LAB | Facility: MEDICAL CENTER | Age: 27
End: 2022-10-21
Payer: COMMERCIAL

## 2022-10-21 DIAGNOSIS — Z3A.17 17 WEEKS GESTATION OF PREGNANCY: ICD-10-CM

## 2022-10-21 DIAGNOSIS — O12.12 PROTEINURIA AFFECTING PREGNANCY IN SECOND TRIMESTER: ICD-10-CM

## 2022-10-21 DIAGNOSIS — Z3A.15 15 WEEKS GESTATION OF PREGNANCY: ICD-10-CM

## 2022-10-21 LAB
CREAT UR-MCNC: 46.9 MG/DL
PROT UR-MCNC: <6 MG/DL
PROT/CREAT UR: <0.13 MG/G{CREAT} (ref 0–0.1)

## 2022-10-21 PROCEDURE — 84156 ASSAY OF PROTEIN URINE: CPT

## 2022-10-21 PROCEDURE — 82570 ASSAY OF URINE CREATININE: CPT

## 2022-11-07 NOTE — PATIENT INSTRUCTIONS
Thank you for choosing us for your  care today  If you have any questions about your ultrasound or care, please do not hesitate to contact us or your primary obstetrician  Some general instructions for your pregnancy are:    Protect against coronavirus: get vaccinated - pregnant women are increased risk of severe COVID  Notify your primary care doctor if you have any symptoms  Exercise: Aim for 22 minutes per day (150 minutes per week) of regular exercise  Walking is great! Nutrition: aim for calcium-rich and iron-rich foods as well as healthy sources of protein  Learn about Preeclampsia: preeclampsia is a common, serious high blood pressure complication in pregnancy  A blood pressure of 836QVGQ (systolic or top number) or 73URVX (diastolic or bottom number) is not normal and needs evaluation by your doctor  Aspirin is sometimes prescribed in early pregnancy to prevent preeclampsia in women with risk factors - ask your obstetrician if you should be on this medication  If you smoke, try to reduce how many cigarettes you smoke or try to quit completely  Do not vape  Other warning signs to watch out for in pregnancy or postpartum: chest pain, obstructed breathing or shortness of breath, seizures, thoughts of hurting yourself or your baby, bleeding, a painful or swollen leg, fever, or headache (see [facility] POST-BIRTH Warning Signs campaign)  If these happen call 911  Itching is also not normal in pregnancy and if you experience this, especially over your hands and feet, potentially worse at night, notify your doctors

## 2022-11-08 ENCOUNTER — ROUTINE PRENATAL (OUTPATIENT)
Dept: PERINATAL CARE | Facility: CLINIC | Age: 27
End: 2022-11-08

## 2022-11-08 VITALS
BODY MASS INDEX: 33.36 KG/M2 | HEIGHT: 64 IN | HEART RATE: 122 BPM | DIASTOLIC BLOOD PRESSURE: 62 MMHG | WEIGHT: 195.4 LBS | SYSTOLIC BLOOD PRESSURE: 112 MMHG

## 2022-11-08 DIAGNOSIS — Z36.86 ENCOUNTER FOR ANTENATAL SCREENING FOR CERVICAL LENGTH: ICD-10-CM

## 2022-11-08 DIAGNOSIS — Z36.3 ENCOUNTER FOR ANTENATAL SCREENING FOR MALFORMATION: Primary | ICD-10-CM

## 2022-11-08 DIAGNOSIS — Z3A.20 20 WEEKS GESTATION OF PREGNANCY: ICD-10-CM

## 2022-11-08 NOTE — PROGRESS NOTES
[address]: Ms Dalton Olivares was seen today for anatomic survey and cervical length screening ultrasound  See ultrasound report under "OB Procedures" tab  The time spent on this established patient on the encounter date included 4 minutes previsit service time reviewing records and precharting, 5 minutes face-to-face service time counseling regarding results and coordinating care, and  5 minutes charting, totalling 14 minutes    Please don't hesitate to contact our office with any concerns or questions   -Michela Dupont

## 2022-11-08 NOTE — PROGRESS NOTES
Ultrasound Probe Disinfection    A transvaginal ultrasound was performed  Prior to use, disinfection was performed with High Level Disinfection Process (Trophon)  Probe serial number [id number]: [id number] was used        Maryellen Wilks  11/08/22  1:33 PM

## 2022-11-17 ENCOUNTER — ROUTINE PRENATAL (OUTPATIENT)
Dept: OBGYN CLINIC | Facility: MEDICAL CENTER | Age: 27
End: 2022-11-17

## 2022-11-17 VITALS — BODY MASS INDEX: 33.81 KG/M2 | SYSTOLIC BLOOD PRESSURE: 102 MMHG | WEIGHT: 197 LBS | DIASTOLIC BLOOD PRESSURE: 62 MMHG

## 2022-11-17 DIAGNOSIS — O98.512 COVID-19 AFFECTING PREGNANCY IN SECOND TRIMESTER: ICD-10-CM

## 2022-11-17 DIAGNOSIS — Z34.82 PRENATAL CARE, SUBSEQUENT PREGNANCY, SECOND TRIMESTER: Primary | ICD-10-CM

## 2022-11-17 DIAGNOSIS — Z3A.21 21 WEEKS GESTATION OF PREGNANCY: ICD-10-CM

## 2022-11-17 DIAGNOSIS — U07.1 COVID-19 AFFECTING PREGNANCY IN SECOND TRIMESTER: ICD-10-CM

## 2022-11-17 LAB
SL AMB  POCT GLUCOSE, UA: ABNORMAL
SL AMB POCT URINE PROTEIN: ABNORMAL

## 2022-11-17 NOTE — ASSESSMENT & PLAN NOTE
Alistair Talamantes  is a 28 y o  [id number] @21w4d who presents for routine prenatal visit  Denies OB complaints  Level II - completed on 11/8/22; normal growth; incomplete anatomy including missing views of the spine  She declined AFP  Has 32 wk growth/follow up anatomy scheduled    Good fetal movement  Denies contractions, cramping, leakage of fluid or vaginal bleeding  Reviewed PTL precautions and reasons to call 
COVID + on 11/11/22; sx onset 11/8/22  Unvaccinated  Mild symptoms of congestion only   She did not have fever
show

## 2022-11-17 NOTE — PROGRESS NOTES
COVID-19 affecting pregnancy in second trimester  COVID + on 11/11/22; sx onset 11/8/22  Unvaccinated  Mild symptoms of congestion only  She did not have fever    21 weeks gestation of pregnancy  Claudia Benitez  is a 28 y o  [id number] @21w4d who presents for routine prenatal visit  Denies OB complaints  Level II - completed on 11/8/22; normal growth; incomplete anatomy including missing views of the spine  She declined AFP  Has 32 wk growth/follow up anatomy scheduled    Good fetal movement  Denies contractions, cramping, leakage of fluid or vaginal bleeding  Reviewed PTL precautions and reasons to call

## 2022-11-17 NOTE — PROGRESS NOTES
PNV  21w4d  [id number]  Patient denies any lof, vb or ctx  Patient has +fm  Patient urine is trace pro/neg glu   Flu - pt sick today, deferred  Patient has no concerns today

## 2022-12-13 PROBLEM — Z3A.25 25 WEEKS GESTATION OF PREGNANCY: Status: ACTIVE | Noted: 2022-09-22

## 2022-12-13 NOTE — PATIENT INSTRUCTIONS
Pregnancy at 23 to 26 Weeks   AMBULATORY CARE:   What changes are happening to your body: You are now close to or at the beginning of the third trimester  The third trimester starts at 24 weeks and ends with delivery  As your baby gets larger, you may develop certain symptoms  These may include pain in your back or down the sides of your abdomen  You may also have stretch marks on your abdomen, breasts, thighs, or buttocks  You may also have constipation  Seek care immediately if:   You develop a severe headache that does not go away  You have new or increased vision changes, such as blurred or spotted vision  You have new or increased swelling in your face or hands  You have vaginal spotting or bleeding  Your water broke or you feel warm water gushing or trickling from your vagina  Call your doctor or obstetrician if:   You have abdominal cramps, pressure, or tightening  You have a change in vaginal discharge  You have light bleeding  You have chills or a fever  You have vaginal itching, burning, or pain  You have yellow, green, white, or foul-smelling vaginal discharge  You have pain or burning when you urinate, less urine than usual, or pink or bloody urine  You have questions or concerns about your condition or care  How to care for yourself at this stage of your pregnancy:       Eat a variety of healthy foods  Healthy foods include fruits, vegetables, whole-grain breads, low-fat dairy foods, beans, lean meats, and fish  Drink liquids as directed  Ask how much liquid to drink each day and which liquids are best for you  Limit caffeine to less than 200 milligrams each day  Limit your intake of fish to 2 servings each week  Choose fish low in mercury such as canned light tuna, shrimp, salmon, cod, or tilapia  Do not  eat fish high in mercury such as swordfish, tilefish, luan mackerel, and shark  Manage back pain    Do not stand for long periods of time or lift heavy items  Use good posture while you stand, squat, or bend  Wear low-heeled shoes with good support  Rest may also help to relieve back pain  Ask your healthcare provider about exercises you can do to strengthen your back muscles  Take prenatal vitamins as directed  Your need for certain vitamins and minerals, such as folic acid, increases during pregnancy  Prenatal vitamins provide some of the extra vitamins and minerals you need  Prenatal vitamins may also help to decrease the risk of certain birth defects  Talk to your healthcare provider about exercise  Moderate exercise can help you stay fit  Your healthcare provider will help you plan an exercise program that is safe for you during pregnancy  Do not smoke  Smoking increases your risk of a miscarriage and other health problems during your pregnancy  Smoking can cause your baby to be born too early or weigh less at birth  Ask your healthcare provider for information if you need help quitting  Do not drink alcohol  Alcohol passes from your body to your baby through the placenta  It can affect your baby's brain development and cause fetal alcohol syndrome (FAS)  FAS is a group of conditions that causes mental, behavior, and growth problems  Talk to your healthcare provider before you take any medicines  Many medicines may harm your baby if you take them when you are pregnant  Do not take any medicines, vitamins, herbs, or supplements without first talking to your healthcare provider  Never use illegal or street drugs (such as marijuana or cocaine) while you are pregnant  Safety tips during pregnancy:   Avoid hot tubs and saunas  Do not use a hot tub or sauna while you are pregnant, especially during your first trimester  Hot tubs and saunas may raise your baby's temperature and increase the risk of birth defects  Avoid toxoplasmosis  This is an infection caused by eating raw meat or being around infected cat feces   It can cause birth defects, miscarriages, and other problems  Wash your hands after you touch raw meat  Make sure any meat is well-cooked before you eat it  Avoid raw eggs and unpasteurized milk  Use gloves or ask someone else to clean your cat's litter box while you are pregnant  Changes that are happening with your baby:  By 26 weeks, your baby will weigh about 2 pounds  Your baby will be about 10 inches long from the top of the head to the rump (baby's bottom)  Your baby's movements are much stronger now  Your baby's eyes are almost completely formed and can partially open  Your baby also sleeps and wakes up  What you need to know about prenatal care: Your healthcare provider will check your blood pressure and weight  You may also need the following:  A urine test  may also be done to check for sugar and protein  These can be signs of gestational diabetes or infection  Protein in your urine may also be a sign of preeclampsia  Preeclampsia is a condition that can develop during week 20 or later of your pregnancy  It causes high blood pressure, and it can cause problems with your kidneys and other organs  A gestational diabetes screen  may be done  Your healthcare provider may order either a 1-step or 2-step oral glucose tolerance test (OGTT)  1-step OGTT:  Your blood sugar level will be tested after you have not eaten for 8 hours (fasting)  You will then be given a glucose drink  Your level will be tested again 1 hour and 2 hours after you finish the drink  2-step OGTT:  You do not have to fast for the first part of the test  You will have the glucose drink at any time of day  Your blood sugar level will be checked 1 hour later  If your blood sugar is higher than a certain level, another test will be ordered  You will fast and your blood sugar level will be tested  You will have the glucose drink  Your blood will be tested again 1 hour, 2 hours, and 3 hours after you finish the glucose drink      Fundal height is a measurement of your uterus to check your baby's growth  This number is usually the same as the number of weeks that you have been pregnant  Your baby's heart rate  will be checked  Follow up with your doctor or obstetrician as directed:  Write down your questions so you remember to ask them during your visits  © Copyright [employer] 2022 Information is for End User's use only and may not be sold, redistributed or otherwise used for commercial purposes  All illustrations and images included in CareNotes® are the copyrighted property of A D A M , Inc  or [facility] Devon Rowley   The above information is an  only  It is not intended as medical advice for individual conditions or treatments  Talk to your doctor, nurse or pharmacist before following any medical regimen to see if it is safe and effective for you  Kick Counts in Pregnancy   AMBULATORY CARE:   Kick counts  measure how much your baby is moving in your womb  A kick from your baby can be felt as a twist, turn, swish, roll, or jab  It is common to feel your baby kicking at 26 to 28 weeks of pregnancy  You may feel your baby kick as early as 20 weeks of pregnancy  You may want to start counting at 28 weeks  Contact your doctor immediately if:   You feel a change in the number of kicks or movements of your baby  You feel fewer than 10 kicks within 2 hours  You have questions or concerns about your baby's movements  Why measure kick counts:  Your baby's movement may provide information about your baby's health  He or she may move less, or not at all, if there are problems  Your baby may move less if he or she is not getting enough oxygen or nutrition from the placenta  Do not smoke while you are pregnant  Smoking decreases the amount of oxygen that gets to your baby  Talk to your healthcare provider if you need help to quit smoking  Tell your healthcare provider as soon as you feel a change in your baby's movements    When to measure kick counts:   Measure kick counts at the same time every day  Measure kick counts when your baby is awake and most active  Your baby may be most active in the evening  How to measure kick counts:  Check that your baby is awake before you measure kick counts  You can wake up your baby by lightly pushing on your belly, walking, or drinking something cold  Your healthcare provider may tell you different ways to measure kick counts  You may be told to do the following:  Use a chart or clock to keep track of the time you start and finish counting  Sit in a chair or lie on your left side  Place your hands on the largest part of your belly  Count until you reach 10 kicks  Write down how much time it takes to count 10 kicks  It may take 30 minutes to 2 hours to count 10 kicks  It should not take more than 2 hours to count 10 kicks  Follow up with your doctor as directed:  Write down your questions so you remember to ask them during your visits  © Copyright [employer] 2022 Information is for End User's use only and may not be sold, redistributed or otherwise used for commercial purposes  All illustrations and images included in CareNotes® are the copyrighted property of A D A M , Inc  or [employer]   The above information is an  only  It is not intended as medical advice for individual conditions or treatments  Talk to your doctor, nurse or pharmacist before following any medical regimen to see if it is safe and effective for you  Stefan Elliott Contractions   AMBULATORY CARE:   Lisa Williamson contractions  are tightening and squeezing of the muscles of your uterus (womb) during pregnancy  The uterine muscles control the uterus  Monroe Elliott contractions stop on their own  They are not true labor contractions and do not cause your cervix (opening to your uterus) to dilate (open)    Common symptoms include the following:   Pain or discomfort in your groin or lower abdomen that comes and goes    Your contractions are short, and do not last longer each time they happen    Your contractions do not get closer together each time    Your contractions do not get stronger or more painful each time    Your contractions stop when you change your position or rest    Seek care immediately if:   You have bleeding from your vagina  You have fluid leaking from your vagina that does not stop  You feel a gush of fluid from your vagina  Your contractions happen every 5 minutes or sooner, and last for more than 60 seconds  Your contractions begin to feel stronger or more painful  You feel a change in your baby's movement, or you feel fewer than 6 to 10 movements in an hour  Call your doctor or obstetrician if:   You have a fever  You have questions or concerns about your condition or care  Treatment for Gilpin Elliott contractions  may include pain medicine to relieve discomfort or pain or sedatives to relax the muscles of your uterus  If you are dehydrated, he or she may give you fluids through an IV or tell you to drink liquids  Self-care:   Change your activity or your position  when you feel contractions begin  Walk if you have been lying or sitting  Lie down if you have been standing or walking  True labor will not stop by changing your position or activity  Take a warm bath  to relax your body  Drink more liquids  to prevent dehydration  Ask how much liquid to drink each day and which liquids are best for you  Practice your labor breathing  to decrease your discomfort  This may help you get ready for true labor  Take slow, deep breaths, or fast, short breaths  Ask your healthcare provider how to practice labor breathing  Follow up with your doctor or obstetrician as directed:  Write down your questions so you remember to ask them during your visits    © Copyright [employer] 2022 Information is for End User's use only and may not be sold, redistributed or otherwise used for commercial purposes  All illustrations and images included in CareNotes® are the copyrighted property of A D A M , Inc  or Yuko Brown  The above information is an  only  It is not intended as medical advice for individual conditions or treatments  Talk to your doctor, nurse or pharmacist before following any medical regimen to see if it is safe and effective for you  Round Ligament Pain   WHAT YOU NEED TO KNOW:   What is round ligament pain? Round ligament pain is caused when ligaments are stretched as your uterus (womb) gets bigger during pregnancy  Round ligaments are found on each side of your uterus  They are bands of tissue that hold the uterus in place  Round ligament pain happens most often during the second trimester  It is a normal part of pregnancy and should stop by the third trimester  The pain is not serious and will not hurt your baby  What are the signs and symptoms of round ligament pain? Pain on one or both sides of your lower abdomen or groin that may move up to your hip    Spasms in the muscles in your abdomen    Pain that lasts a few seconds    Pain that happens when you exercise, sneeze, change positions, or stand quickly    How is round ligament pain diagnosed? Your healthcare provider will examine you and ask about your pain  Tell the provider when the pain started, and if you feel it on one or both sides  Your provider may ask if anything helps the pain or makes it worse  What can I do to manage my pain? Round ligament pain does not need to be treated  The following may help make you more comfortable:  Rest as often as you can  Rest can help relieve round ligament pain  You might want to lie on the side that has pain  Place a pillow under your abdomen  Keep another pillow between your knees  Move slowly  Sudden movement can stretch the ligaments and cause pain  Stand, sit, and change positions slowly   Try to tighten the muscles in your hips before you sneeze or laugh  You can also sit down and bring your knees up toward your abdomen  This can help relieve tension on the ligaments  Exercise as directed  Gentle exercise can keep the ligaments loose and strengthen core (abdominal) muscles  An example is swimming, or a yoga program designed for pregnancy  Ask your healthcare provider which exercises are safe for you and how often to exercise  For most healthy women, a good goal is to try to get at least 30 minutes of exercise every day  If activity causes pain, try not to walk too long or too far at one time  Break your exercise up into short amounts  Apply a warm compress to the area  Warmth can relieve pain and muscle spasms  Ask your healthcare provider if you can take a warm bath or use a heating pad  Keep all heat settings low  High heat can be dangerous for your baby  Do not sit in a hot tub or use hot water in your bath  You may also be able to massage the area gently while you are applying heat  Massage can help relieve pain  Ask about pain medicines  Ask your healthcare provider before you take any medicine during pregnancy, including over-the-counter pain medicines  Your healthcare provider may recommend acetaminophen to relieve the pain  Ask how much to take and how often to take it  Follow directions  Acetaminophen can cause liver damage  Too much medicine can be harmful to your baby  When should I contact my healthcare provider? You have pain that is spreading to other parts of your body  You have new or worsening pain  You have pain that lasts longer than a few minutes at a time  You have questions or concerns about your condition or care  CARE AGREEMENT:   You have the right to help plan your care  Learn about your health condition and how it may be treated  Discuss treatment options with your healthcare providers to decide what care you want to receive   You always have the right to refuse treatment  The above information is an  only  It is not intended as medical advice for individual conditions or treatments  Talk to your doctor, nurse or pharmacist before following any medical regimen to see if it is safe and effective for you  ©  [employer]  Information is for End User's use only and may not be sold, redistributed or otherwise used for commercial purposes  All illustrations and images included in CareNotes® are the copyrighted property of [employer]  or [address]:     [facility] pregnancy essential guide    http://amish franco/      On the right side of the screen is a 50 page guide providing valuable information about your entire pregnancy  On the left hand side of the site you will see several other links to great information and resources that [facility] offers     If you click on the tab that says "Pregnancy and Birth Packet" this opens another  guide to labor and delivery information as well as breast feeding information,  care, pediatricians, car seat safety and much more     The [facility] and [address] tab has a virtual tour of the new L&D unit, as well as valuable information about classes that are offered, breast feeding support, support groups and much more  I highly recommend the virtual Breast Feeding class, very informational even if you have breast fed in the past  Check for available dates ! Click around and enjoy all we have to offer!     Please note that all information in regards to locations and visiting hours have not been updated due to 2 Katey Weber delivery location is [address] [place] [address],[address], [address] [address]

## 2022-12-13 NOTE — ASSESSMENT & PLAN NOTE
Freddy Yoon is a 28 y o   25w4d  TWG 2 722 kg (6 lb)   Taking PNV daily   28 week labs ordered , aware of need for Rhogam at 28 weeks  Feels well  Denies LOF/CTX/VB  No concerns  AFP declined  Vaccines discussed , declines flu vaccine    labor precautions reviewed  Encouraged adequate hydration and nutrition  Pregnancy Essential guide and [facility] and Me center web site recommended

## 2022-12-15 ENCOUNTER — ROUTINE PRENATAL (OUTPATIENT)
Dept: OBGYN CLINIC | Facility: MEDICAL CENTER | Age: 27
End: 2022-12-15

## 2022-12-15 VITALS
WEIGHT: 201 LBS | BODY MASS INDEX: 34.31 KG/M2 | DIASTOLIC BLOOD PRESSURE: 68 MMHG | HEIGHT: 64 IN | SYSTOLIC BLOOD PRESSURE: 110 MMHG

## 2022-12-15 DIAGNOSIS — Z34.82 PRENATAL CARE, SUBSEQUENT PREGNANCY, SECOND TRIMESTER: Primary | ICD-10-CM

## 2022-12-15 LAB
SL AMB  POCT GLUCOSE, UA: NORMAL
SL AMB POCT URINE PROTEIN: NORMAL

## 2022-12-15 NOTE — PROGRESS NOTES
Problem   25 Weeks Gestation of Pregnancy    O neg, needs Rhogam at 28 weeks  First OB labs - complete  Gc/chlamydia -Neg  Pap - up to date  [employer] - given    Genetic screening - low risk  AFP -discussed;  ordered  10/6/22; refused 10/20/22  Influenza-refused    28 week labs -ordered 12/15/22  COVID vaccine -   TDAP -   Delivery consent -   Yellow folder -     Breast pump -   Pediatrician -   Perineal massage -   GBS -          25 weeks gestation of pregnancy  Tracy Flower is a 28 y o   25w4d  TWG 2 722 kg (6 lb)   Taking PNV daily   28 week labs ordered , aware of need for Rhogam at 28 weeks  Feels well  Denies LOF/CTX/VB  No concerns  AFP declined  Vaccines discussed , declines flu vaccine    labor precautions reviewed  Encouraged adequate hydration and nutrition  Pregnancy Essential guide and Baby and Me center web site recommended

## 2022-12-15 NOTE — PROGRESS NOTES
[id number] 25w4d  Pap 03/31/2021    GC/CT:  PN1 Labs: 9/9/2022  Blood Type: O Negative    Rhogam @ 28 weeks   MFM Level 1: 9/19/2022  MFM Level 2: 11/08/2022  AFP:    Declines   28 Week Labs:  TDap:  Flu:  GBS:   Blue Folder:  Given   Yellow Folder:    Ped Referral :  Given   Breast pump:  L&D forms:  Delivery consent:

## 2023-01-04 ENCOUNTER — APPOINTMENT (OUTPATIENT)
Dept: LAB | Facility: MEDICAL CENTER | Age: 28
End: 2023-01-04

## 2023-01-04 DIAGNOSIS — Z34.82 PRENATAL CARE, SUBSEQUENT PREGNANCY, SECOND TRIMESTER: ICD-10-CM

## 2023-01-04 LAB
BASOPHILS # BLD AUTO: 0.02 THOUSANDS/ÂΜL (ref 0–0.1)
BASOPHILS NFR BLD AUTO: 0 % (ref 0–1)
EOSINOPHIL # BLD AUTO: 0.04 THOUSAND/ÂΜL (ref 0–0.61)
EOSINOPHIL NFR BLD AUTO: 0 % (ref 0–6)
ERYTHROCYTE [DISTWIDTH] IN BLOOD BY AUTOMATED COUNT: 14.1 % (ref 11.6–15.1)
GLUCOSE 1H P 50 G GLC PO SERPL-MCNC: 171 MG/DL (ref 40–134)
HCT VFR BLD AUTO: 33.8 % (ref 34.8–46.1)
HGB BLD-MCNC: 11.2 G/DL (ref 11.5–15.4)
IMM GRANULOCYTES # BLD AUTO: 0.08 THOUSAND/UL (ref 0–0.2)
IMM GRANULOCYTES NFR BLD AUTO: 1 % (ref 0–2)
LYMPHOCYTES # BLD AUTO: 1.89 THOUSANDS/ÂΜL (ref 0.6–4.47)
LYMPHOCYTES NFR BLD AUTO: 13 % (ref 14–44)
MCH RBC QN AUTO: 28.4 PG (ref 26.8–34.3)
MCHC RBC AUTO-ENTMCNC: 33.1 G/DL (ref 31.4–37.4)
MCV RBC AUTO: 86 FL (ref 82–98)
MONOCYTES # BLD AUTO: 0.54 THOUSAND/ÂΜL (ref 0.17–1.22)
MONOCYTES NFR BLD AUTO: 4 % (ref 4–12)
NEUTROPHILS # BLD AUTO: 11.49 THOUSANDS/ÂΜL (ref 1.85–7.62)
NEUTS SEG NFR BLD AUTO: 82 % (ref 43–75)
NRBC BLD AUTO-RTO: 0 /100 WBCS
PLATELET # BLD AUTO: 252 THOUSANDS/UL (ref 149–390)
PMV BLD AUTO: 9 FL (ref 8.9–12.7)
RBC # BLD AUTO: 3.94 MILLION/UL (ref 3.81–5.12)
WBC # BLD AUTO: 14.06 THOUSAND/UL (ref 4.31–10.16)

## 2023-01-05 ENCOUNTER — ROUTINE PRENATAL (OUTPATIENT)
Dept: OBGYN CLINIC | Facility: MEDICAL CENTER | Age: 28
End: 2023-01-05

## 2023-01-05 VITALS
WEIGHT: 203.6 LBS | SYSTOLIC BLOOD PRESSURE: 118 MMHG | BODY MASS INDEX: 34.76 KG/M2 | DIASTOLIC BLOOD PRESSURE: 78 MMHG | HEIGHT: 64 IN

## 2023-01-05 DIAGNOSIS — Z34.82 PRENATAL CARE, SUBSEQUENT PREGNANCY, SECOND TRIMESTER: Primary | ICD-10-CM

## 2023-01-05 DIAGNOSIS — O99.810 ABNORMAL GLUCOSE AFFECTING PREGNANCY: Primary | ICD-10-CM

## 2023-01-05 DIAGNOSIS — Z67.91 RH NEGATIVE STATE IN ANTEPARTUM PERIOD: ICD-10-CM

## 2023-01-05 DIAGNOSIS — O09.299 HISTORY OF FORCEPS DELIVERY IN PRIOR PREGNANCY, CURRENTLY PREGNANT: ICD-10-CM

## 2023-01-05 DIAGNOSIS — O26.899 RH NEGATIVE STATE IN ANTEPARTUM PERIOD: ICD-10-CM

## 2023-01-05 LAB
RPR SER QL: NORMAL
SL AMB  POCT GLUCOSE, UA: ABNORMAL
SL AMB POCT URINE PROTEIN: ABNORMAL

## 2023-01-05 NOTE — PROGRESS NOTES
28 y o   at 28w4d presents for routine prenatal visit  She denies contractions/leakage of fluid/vaginal bleeding  She feels good fetal movement  Problem List Items Addressed This Visit        Other    History of forceps delivery in prior pregnancy, currently pregnant   Other Visit Diagnoses     Prenatal care, subsequent pregnancy, second trimester    -  Primary  Plans to complete 3 hr gtt asap  Growth US scheduled  F/u in 2 wks or prn        The patient was counseled about the labor process  She was counseled regarding potential reasons that she may need a  section including arrest of dilation/descent, non-reassuring fetal status, or worsening maternal status  She was counseled on the risks of  including bleeding, infection, and injury to surrounding structures including the bowel and bladder  She was counseled that there are medical and surgical methods to manage excessive postpartum bleeding  She was counseled that in the event of excessive blood loss, she may require blood transfusion which includes a small risk of blood borne diseases such as hepatitis and HIV  The patient is OK with receiving a blood transfusion if necessary  The patient had an opportunity to ask questions and signed consent  She was counseled about the possible need for operative delivery using the vacuum and the indications for doing so  She was counseled that there is a small risk of  complications including intracranial hemorrhage  The patient signed consent

## 2023-01-05 NOTE — PROGRESS NOTES
Patient presents for a routine prenatal visit    28W4D  Good Fetal Movement  No LOF,bleeding,discharge or cramping  No current complaints at this time  Urine: trace/-    Yellow folder given to patient and reviewed at today's visit  28 week labs completed, failed 1 hr GTT needs to go for 3 hr GTT  Already has a pedicatrician, no referral needed  Declined flu and Tdap vaccines, can't receive Tdap with allergy  Rhogham given in L deltoid  Tolerated well    Lot #: [id number]  Exp: 2024

## 2023-01-05 NOTE — PROGRESS NOTES
1 hour Glucose 171, order for 3 hr placed,   Message to pt in my chart sent Health Maintenance Summary     Topic Due On Due Status Completed On Postpone Until Reason    IMMUNIZATION - IPV Aug 30, 2017 Not Due Mar 14, 2014      IMMUNIZATION - MMR Aug 30, 2017 Not Due Sep 2, 2014      IMMUNIZATION - VARICELLA Aug 30, 2017 Not Due Sep 2, 2014      IMMUNIZATION - PNEUMOCOCCAL  Completed Dec 15, 2014      IMMUNIZATION - HEPATITIS B  Completed Mar 14, 2014      IMMUNIZATION - HIB  Completed Dec 15, 2014      IMMUNIZATION - ROTAVIRUS  Aged Out       IMMUNIZATION - HEPATITIS A Aug 30, 2014 Overdue       IMMUNIZATION - MENINGITIS Aug 30, 2024 Not Due       IMMUNIZATION - DTaP/Tdap/Td Aug 30, 2017 Not Due Dec 15, 2014      Immunization-Influenza Sep 1, 2016 Postponed Dec 15, 2014 Apr 1, 2017 Patient Refused          Patient is due for topics as listed above, she wishes to decline at this time .she is currently ill.

## 2023-01-13 ENCOUNTER — APPOINTMENT (OUTPATIENT)
Dept: LAB | Facility: CLINIC | Age: 28
End: 2023-01-13

## 2023-01-13 DIAGNOSIS — O99.810 ABNORMAL GLUCOSE AFFECTING PREGNANCY: Primary | ICD-10-CM

## 2023-01-13 DIAGNOSIS — O99.810 ABNORMAL GLUCOSE AFFECTING PREGNANCY: ICD-10-CM

## 2023-01-13 PROBLEM — O24.419 GESTATIONAL DIABETES MELLITUS (GDM) IN THIRD TRIMESTER: Status: ACTIVE | Noted: 2023-01-13

## 2023-01-13 LAB
GLUCOSE 1H P 100 G GLC PO SERPL-MCNC: 188 MG/DL (ref 70–183)
GLUCOSE 2H P 100 G GLC PO SERPL-MCNC: 181 MG/DL (ref 70–155)
GLUCOSE 3H P 100 G GLC PO SERPL-MCNC: 131 MG/DL (ref 70–140)
GLUCOSE P FAST SERPL-MCNC: 82 MG/DL (ref 65–94)

## 2023-01-13 NOTE — PROGRESS NOTES
Referral placed to maternal-fetal medicine for diabetic education patient's 1 hour Glucola and 3-hour Glucola were abnormal

## 2023-01-17 ENCOUNTER — ROUTINE PRENATAL (OUTPATIENT)
Dept: OBGYN CLINIC | Facility: CLINIC | Age: 28
End: 2023-01-17

## 2023-01-17 VITALS — WEIGHT: 198.8 LBS | BODY MASS INDEX: 34.12 KG/M2 | DIASTOLIC BLOOD PRESSURE: 78 MMHG | SYSTOLIC BLOOD PRESSURE: 122 MMHG

## 2023-01-17 DIAGNOSIS — Z3A.30 30 WEEKS GESTATION OF PREGNANCY: Primary | ICD-10-CM

## 2023-01-17 DIAGNOSIS — O24.419 GESTATIONAL DIABETES MELLITUS (GDM) IN THIRD TRIMESTER, GESTATIONAL DIABETES METHOD OF CONTROL UNSPECIFIED: ICD-10-CM

## 2023-01-17 DIAGNOSIS — Z34.93 PRENATAL CARE IN THIRD TRIMESTER: ICD-10-CM

## 2023-01-17 DIAGNOSIS — O09.299 HISTORY OF FORCEPS DELIVERY IN PRIOR PREGNANCY, CURRENTLY PREGNANT: ICD-10-CM

## 2023-01-17 LAB
DME PARACHUTE DELIVERY DATE REQUESTED: NORMAL
DME PARACHUTE ITEM DESCRIPTION: NORMAL
DME PARACHUTE ORDER STATUS: NORMAL
DME PARACHUTE SUPPLIER NAME: NORMAL
DME PARACHUTE SUPPLIER PHONE: NORMAL
SL AMB  POCT GLUCOSE, UA: ABNORMAL
SL AMB POCT URINE PROTEIN: ABNORMAL

## 2023-01-17 NOTE — PROGRESS NOTES
Prenatal visit   GA 30w 2d  Denies any bleeding or cramping   28 wk labs completed   3 HR GTT abnormal (Scheduled with diabetic path on 1/20/2023)  Declines Tdap vaccine due to allergic reactions  Breast pump ordered today   Delivery consent signed

## 2023-01-17 NOTE — ASSESSMENT & PLAN NOTE
Has appointment on Friday for Class 1 with diabetic pathways  Scheduled for follow up growth scan       No results found for: HGBA1C no

## 2023-01-17 NOTE — PROGRESS NOTES
Problem List Items Addressed This Visit        Endocrine    Gestational diabetes mellitus (GDM) in third trimester     Has appointment on Friday for Class 1 with diabetic pathways  Scheduled for follow up growth scan  No results found for: HGBA1C            Other    30 weeks gestation of pregnancy - Primary     S/P Rhogam last visit  28wk labs completed  Baby is active  No bleeding, LOF, contractions            History of forceps delivery in prior pregnancy, currently pregnant   Other Visit Diagnoses     Prenatal care in third trimester        Relevant Orders    POCT urine dip (Completed)

## 2023-01-20 ENCOUNTER — TELEMEDICINE (OUTPATIENT)
Facility: HOSPITAL | Age: 28
End: 2023-01-20

## 2023-01-20 DIAGNOSIS — O99.810 ABNORMAL GLUCOSE AFFECTING PREGNANCY: ICD-10-CM

## 2023-01-20 DIAGNOSIS — O24.419 GESTATIONAL DIABETES MELLITUS (GDM) IN THIRD TRIMESTER, GESTATIONAL DIABETES METHOD OF CONTROL UNSPECIFIED: Primary | ICD-10-CM

## 2023-01-20 DIAGNOSIS — O99.213 OBESITY AFFECTING PREGNANCY, ANTEPARTUM, THIRD TRIMESTER: ICD-10-CM

## 2023-01-20 DIAGNOSIS — Z3A.30 30 WEEKS GESTATION OF PREGNANCY: ICD-10-CM

## 2023-01-20 RX ORDER — BLOOD SUGAR DIAGNOSTIC
STRIP MISCELLANEOUS
Qty: 100 STRIP | Refills: 4 | Status: SHIPPED | OUTPATIENT
Start: 2023-01-20 | End: 2023-03-26

## 2023-01-20 RX ORDER — BLOOD-GLUCOSE METER
EACH MISCELLANEOUS
Qty: 1 KIT | Refills: 0 | Status: SHIPPED | OUTPATIENT
Start: 2023-01-20 | End: 2023-03-26

## 2023-01-20 RX ORDER — LANCETS 33 GAUGE
EACH MISCELLANEOUS
Qty: 100 EACH | Refills: 4 | Status: SHIPPED | OUTPATIENT
Start: 2023-01-20 | End: 2023-03-26

## 2023-01-20 NOTE — PROGRESS NOTES
Virtual Regular Visit    Verification of patient location:    Patient is located in the following state in which I hold an active license [place]      Assessment/Plan:    Problem List Items Addressed This Visit        Endocrine    Gestational diabetes mellitus (GDM) in third trimester - Primary       Other    30 weeks gestation of pregnancy   Other Visit Diagnoses     Abnormal glucose affecting pregnancy        Obesity affecting pregnancy, antepartum, third trimester                   Reason for visit is   Chief Complaint   Patient presents with   • Gestational Diabetes   • Patient Education   • Virtual Regular Visit        Encounter provider Angelina Verde    Provider located at [facility] [id number]      Recent Visits  No visits were found meeting these conditions  Showing recent visits within past 7 days and meeting all other requirements  Today's Visits  Date Type Provider Dept   23 Telemedicine Angelina Verde An    Showing today's visits and meeting all other requirements  Future Appointments  No visits were found meeting these conditions  Showing future appointments within next 150 days and meeting all other requirements       The patient was identified by name and date of birth  Dafne Barnett was informed that this is a telemedicine visit and that the visit is being conducted through the [employer] platform  She agrees to proceed     My office door was closed  No one else was in the room  Patient was aware that she was part of a virtual group class 1 appointment  She acknowledged consent and understanding of privacy and security of the video platform  The patient has agreed to participate and understands they can discontinue the visit at any time  Patient is aware this is a billable service  Subjective  Dafne Barnett is a 28 y  o pregnant female          HPI     Past Medical History:   Diagnosis Date   • Cyclic vomiting syndrome    • Migraine     hx of   • Miscarriage     2015 NO D & C   • Rh incompatibility    • Scoliosis    • Varicella     had vaccines   • Visual impairment     contacts       Past Surgical History:   Procedure Laterality Date   • WISDOM TOOTH EXTRACTION Bilateral        Current Outpatient Medications   Medication Sig Dispense Refill   • acetaminophen (TYLENOL) 325 mg tablet Take 2 tablets (650 mg total) by mouth every 6 (six) hours as needed for headaches 30 tablet 0   • Prenatal Vit-Fe Fumarate-FA (PRENATAL COMPLETE PO) Take by mouth       No current facility-administered medications for this visit  Allergies   Allergen Reactions   • Penicillins Hives     As child-unknown reaction   • Pertussis Vaccines      Screamed 5+ hours as child, did not get further dosing    • Metoclopramide Anxiety and Chest Pain       Review of Systems    Video Exam    There were no vitals filed for this visit  Physical Exam     I spent 65  minutes with patient today in which greater than 50% of the time was spent in counseling/coordination of care regarding gestational diabetes  Thank you for referring your patient to [facility]etal [facility] Diabetes in Pregnancy Program      Eliza Monzon is a  28 y o  female who presents today for Class 1  Patient is at 30w5d gestation, Estimated Date of Delivery: 3/26/23  Reviewed and updated the following from patients medical record: PMH, Problem List, Allergies, and Current Medications  Visit Diagnosis:  GDM in pregnancy method of control unspecified    Discussed with patient pathophysiology of GDM, untreated hyperglycemia in pregnancy and maternal fetal complications including fetal macrosomia,  hypoglycemia, polyhydramnios, increased incidence of  section,  labor, and in severe cases fetal demise and still birth    Discussed importance of blood glucose monitoring, nutrition, and medication if necessary in achieving BG goals  Additional Pregnancy Complications:  Obesity    Labs:    Lab Results   Component Value Date    PMN4ZYTB88QN 171 (H) 2023       Lab Results   Component Value Date    GLUF 82 2023    YVRWBGQ7VM 188 (H) 2023    BYKUYCL9ZQ 181 (H) 2023    AQHBKBK5CW 131 2023        No components found for: HGA1C    Medications:  No diabetes related medications    Anthropometrics:  Ht Readings from Last 3 Encounters:   23 5' 4" (1 626 m)   12/15/22 5' 4" (1 626 m)   22 5' 4" (1 626 m)     Wt Readings from Last 3 Encounters:   23 90 2 kg (198 lb 12 8 oz)   23 92 4 kg (203 lb 9 6 oz)   12/15/22 91 2 kg (201 lb)     Pre-gravid weight: 88 5 kg (195 lb)  Pre-gravid BMI: 33 46  Weight Change: 1 724 kg (3 lb 12 8 oz)  Weight gain recommendations: BMI (> 30) 11-20 lbs      Recent Ultra Sound Results:  Date: 22  Fetal Growth: Normal  EMELYN: Normal  Next US date: 23    Blood Glucose Monitoring:   Glucose Meter: OneTouch Verio Flex  Instructed on testing blood sugars: 4 x per day (Fasting, 2 hour after start of each meal)    Gave instruction on site selection, skin preparation, loading strips and lancet device, meter activation, obtaining blood sample, test strip and lancet disposal and storage, and recording log book entries  Patient has good understanding of material covered and was able to test their own blood sugar in office today  Instruction for reporting blood sugar results weekly via:  Phone: [phone]   OR  My Chart (Message with image attachment, or Glucose Flowsheet)    Goal Blood Sugar Ranges:   Fastin-90 mg/dL  1 hour after the start of each meal: 140 mg/dL or less  2 hours after start of each meal: 120 mg/dL or less    Meal Plan (daily calorie and protein needs):  Calories: 2100 calorie    Type of Diet:Regular  Additional Nutrition Concerns: patient purchased several keto products prior to class 1 today  Discouraged keto diet and discussed the importance of including adequate carbohydrate in her diet; minimally 175 gms/day in the third trimester  Meal Plan Tips:  1  Patient was provided with a meal plan including 3 meals and 3 snacks  2  Discussed appropriate amounts of CHO, PRO, and Fat at each meal and snack  3  Reviewed CHO exchange list, and portion sizes for both CHO and PRO via food models  4  Instruction on how to read a food label  5  Provided suggested meal/snack options to increase nutrition and maintain consistent meal and snack intakes  6  Instructed on how to keep a 3-day food diary to be brought to follow- up appointment  7  Encouraged  patient to eat every 2 0-3 5 hours while awake  8  Encouraged patient to go no longer than 8-10 hours fasting overnight until first meal of the day  Physical Activity:  Discussed benefits of physical activity to optimize blood glucose control, encouraged activity at patient is physically able  Always consult a physician prior to starting an exercise program  Recommend 20-30 minutes daily  Patient Stated Goal: "I will eat 3 meals and 3 snacks each day, including protein at each"    Diabetes Self Management Support Plan outside of ongoing care: Spouse/Family    Learner/s Present:Learners Present: Patient   Barriers to Learning/Change: No Barriers  Expected Compliance: good    Date to report blood sugars: Requested patient report blood sugars weekly via [facility] glucose flow sheet  Class 2 (date): 1/27/23    Begin Time: 9:30 am  End Time: 10:35 am    It was a pleasure working with them today  Please feel free to call with any questions or concerns      Fito Carr RD,LDN,CDE  Diabetes Educator  [facility]  Diabetes in Pregnancy Program  [address] [place] [address],[address]  [address]

## 2023-01-30 ENCOUNTER — ULTRASOUND (OUTPATIENT)
Facility: HOSPITAL | Age: 28
End: 2023-01-30

## 2023-01-30 ENCOUNTER — DOCUMENTATION (OUTPATIENT)
Dept: PERINATAL CARE | Facility: CLINIC | Age: 28
End: 2023-01-30

## 2023-01-30 VITALS
SYSTOLIC BLOOD PRESSURE: 110 MMHG | HEIGHT: 64 IN | DIASTOLIC BLOOD PRESSURE: 72 MMHG | BODY MASS INDEX: 34.42 KG/M2 | WEIGHT: 201.6 LBS | HEART RATE: 113 BPM

## 2023-01-30 DIAGNOSIS — O99.213 OBESITY AFFECTING PREGNANCY IN THIRD TRIMESTER: ICD-10-CM

## 2023-01-30 DIAGNOSIS — Z36.89 ENCOUNTER FOR ULTRASOUND TO CHECK FETAL GROWTH: ICD-10-CM

## 2023-01-30 DIAGNOSIS — O24.419 GESTATIONAL DIABETES MELLITUS (GDM) IN THIRD TRIMESTER, GESTATIONAL DIABETES METHOD OF CONTROL UNSPECIFIED: ICD-10-CM

## 2023-01-30 DIAGNOSIS — Z3A.32 32 WEEKS GESTATION OF PREGNANCY: Primary | ICD-10-CM

## 2023-01-30 DIAGNOSIS — Z36.2 ENCOUNTER FOR FOLLOW-UP ULTRASOUND OF FETAL ANATOMY: ICD-10-CM

## 2023-01-30 LAB
DME PARACHUTE DELIVERY DATE REQUESTED: NORMAL
DME PARACHUTE ITEM DESCRIPTION: NORMAL
DME PARACHUTE ORDER STATUS: NORMAL
DME PARACHUTE SUPPLIER NAME: NORMAL
DME PARACHUTE SUPPLIER PHONE: NORMAL

## 2023-01-30 NOTE — PROGRESS NOTES
Per MAURISIO Key , this patient has declined attending Class 2 as she already red the booklet  To be determined if patient needs a follow-up session for better understanding about the meal plan

## 2023-01-30 NOTE — PROGRESS NOTES
[address]: Ms Rachel Rushing was seen today for fetal growth and followup missed anatomy ultrasound  See ultrasound report under "OB Procedures" tab  MDM:   I  Diagnoses/Problems addressed:  Acute uncomplicated illness/injury: GDMA1 (ex  GDMA1, [place], UTI)  II  Data: glucose logs, AFP, prior ultrasound  III  Risk of morbidity: minimal    Please don't hesitate to contact our office with any concerns or questions    -Apollo Carmona MD

## 2023-01-31 ENCOUNTER — ROUTINE PRENATAL (OUTPATIENT)
Dept: OBGYN CLINIC | Facility: MEDICAL CENTER | Age: 28
End: 2023-01-31

## 2023-01-31 VITALS — DIASTOLIC BLOOD PRESSURE: 74 MMHG | WEIGHT: 199.2 LBS | BODY MASS INDEX: 34.19 KG/M2 | SYSTOLIC BLOOD PRESSURE: 112 MMHG

## 2023-01-31 DIAGNOSIS — Z3A.32 32 WEEKS GESTATION OF PREGNANCY: ICD-10-CM

## 2023-01-31 DIAGNOSIS — O12.12 PROTEINURIA AFFECTING PREGNANCY IN SECOND TRIMESTER: ICD-10-CM

## 2023-01-31 DIAGNOSIS — Z34.93 PRENATAL CARE IN THIRD TRIMESTER: Primary | ICD-10-CM

## 2023-01-31 DIAGNOSIS — O09.299 HISTORY OF FORCEPS DELIVERY IN PRIOR PREGNANCY, CURRENTLY PREGNANT: ICD-10-CM

## 2023-01-31 DIAGNOSIS — O24.419 GESTATIONAL DIABETES MELLITUS (GDM) IN THIRD TRIMESTER, GESTATIONAL DIABETES METHOD OF CONTROL UNSPECIFIED: ICD-10-CM

## 2023-01-31 LAB
SL AMB  POCT GLUCOSE, UA: NORMAL
SL AMB POCT URINE PROTEIN: NORMAL

## 2023-01-31 NOTE — ASSESSMENT & PLAN NOTE
Completed class 1  Declined class 2  Growth US yesterday  For repeat growth in 4-6 weeks  Reports good control of BS with dietary change  Encouraged weekly report of blood sugars to MFM  TWG 4 lbs  Goal 11-20 based on pregravid BMI 33     No results found for: HGBA1C

## 2023-01-31 NOTE — ASSESSMENT & PLAN NOTE
Raffy English  is a 28 y o   @32w2d who presents for routine prenatal visit  Reports some mild cramping at nighttime  Only occurs with lying flat on her back and relives with position change  Reviewed lying on left side/blood flow, hydration, and when to call  Reports good fetal movement  Denies LOF, vaginal bleeding, regular uterine contractions, headaches or visual changes  Reports mild nasal congestion and RN beginning today  No F/C/S, cough, chest congestion, SOB, or GI sx  Has list of medications safe in pregnancy  Aware to f/u with PCP or urgent care for evaluation or if sx worsen  28 wk labs - GDM - see separate dx  Neg RPR and CBC  Growth scan 2021 - WNL  For f/u in 4-6 weeks for growth and to reassess missed views of spine  TDAP - declined due to allergy  Ped - has   Perineal massage - reviewed   Plans to breastfeed  Pump - previously ordered  Has yellow packet  Reviewed PTL precautions and FKC

## 2023-01-31 NOTE — PROGRESS NOTES
Patient here for PN visit  She denies any complaints; she just has cramping on occasion  Good fetal movement  She is checking her blood sugars  Urine neg for protein and glucose   Up to date with Rhogam

## 2023-01-31 NOTE — PROGRESS NOTES
Problem List Items Addressed This Visit        Endocrine    Gestational diabetes mellitus (GDM) in third trimester     Completed class 1  Declined class 2  Growth US yesterday  For repeat growth in 4-6 weeks  Reports good control of BS with dietary change  Encouraged weekly report of blood sugars to [facility]  TWG 4 lbs  Goal 11-20 based on pregravid BMI 33  No results found for: HGBA1C            Other    32 weeks gestation of pregnancy     Tess Victoria  is a 28 y o  [id number] @32w2d who presents for routine prenatal visit  Reports some mild cramping at nighttime  Only occurs with lying flat on her back and relives with position change  Reviewed lying on left side/blood flow, hydration, and when to call  Reports good fetal movement  Denies LOF, vaginal bleeding, regular uterine contractions, headaches or visual changes  Reports mild nasal congestion and RN beginning today  No F/C/S, cough, chest congestion, SOB, or GI sx  Has list of medications safe in pregnancy  Aware to f/u with PCP or urgent care for evaluation or if sx worsen  28 wk labs - GDM - see separate dx  Neg RPR and CBC  Growth scan 1/30/2021 - WNL  For f/u in 4-6 weeks for growth and to reassess missed views of spine  TDAP - declined due to allergy  Ped - has   Perineal massage - reviewed   Plans to breastfeed  Pump - previously ordered  Has yellow packet  Reviewed PTL precautions and FKC  History of forceps delivery in prior pregnancy, currently pregnant    Proteinuria affecting pregnancy in second trimester     Pr/Cr ratio normal  Neg proteinuria today          Other Visit Diagnoses     Prenatal care in third trimester    -  Primary    Relevant Orders    POCT urine dip (Completed)

## 2023-02-03 ENCOUNTER — DOCUMENTATION (OUTPATIENT)
Dept: PERINATAL CARE | Facility: CLINIC | Age: 28
End: 2023-02-03

## 2023-02-03 NOTE — PROGRESS NOTES
Date: 02/03/23  David Fuentes  1995  Estimated Date of Delivery: 3/26/23  32w5d  OB/GYN:[facility]    Diagnosis:  Diet controlled GDM    Blood Sugar Logs Submitted via: Glucose Flowsheet          Assessment and Plan:  No diabetes related medications  Declined Class 2, only had Class 1   2100 calorie meal plan consisting of 3 meals and 3 snacks daily including protein at each  Advised patient to continue current meal plan  Avoid fasting for > 10 hours overnight  Continue SMBG 4 x per day (Fasting, 2 hour after start of each meal) with a One-Touch Verio  Glucose meter  If okay by physician, recommend up to 30 minutes of physical activity daily     Lab Work:   No results found for: HGBA1C     Ultrasound:       Date:1/30/23       Fetal Growth: Normal       EMELYN: Normal   Next: 2/27/23    Diabetes Self Management Support Plan outside of ongoing care: Spouse/Family   Patient Stated Goal: "I will eat 3 meals and 3 snacks each day, including protein at each"   Goal Assessment:  On track    Date to report next: Weekly     Ashley Kingsley, MS, RD, CDE  Diabetes Educator  [facility] Maternal Fetal Medicine  Diabetes in Pregnancy Program  [address] [place] [address],[address]  [address]

## 2023-02-10 ENCOUNTER — DOCUMENTATION (OUTPATIENT)
Dept: PERINATAL CARE | Facility: CLINIC | Age: 28
End: 2023-02-10

## 2023-02-10 ENCOUNTER — PATIENT MESSAGE (OUTPATIENT)
Dept: PERINATAL CARE | Facility: CLINIC | Age: 28
End: 2023-02-10

## 2023-02-10 NOTE — PROGRESS NOTES
Date: 02/10/23  David Fuentes  1995  Estimated Date of Delivery: 3/26/23  33w5d  OB/GYN:[facility]    Diagnosis:  Diet controlled GDM    Blood Sugar Logs Submitted via: Glucose Flowsheet    Reported on 2/4/23 that [place] & did not her her normal meals & snacks available  Stated she will try to plan better  Assessment and Plan:  No diabetes related medications  Declined Class 2, only had Class 1   2100 calorie meal plan consisting of 3 meals and 3 snacks daily including protein at each  Advised patient to continue current meal plan  Avoid fasting for > 10 hours overnight  Continue SMBG 4 x per day (Fasting, 2 hour after start of each meal) with a One-Touch Verio  Glucose meter  If okay by physician, recommend up to 30 minutes of physical activity daily     Lab Work:   No results found for: HGBA1C     Ultrasound:       Date:1/30/23       Fetal Growth: Normal       EMELYN: Normal   Next: 2/27/23    Diabetes Self Management Support Plan outside of ongoing care: Spouse/Family   Patient Stated Goal: "I will eat 3 meals and 3 snacks each day, including protein at each"   Goal Assessment:  On track    Date to report next: Weekly     sAhley Kingsley MS, RD, CDE  Diabetes Educator  [facility] Maternal Fetal Medicine  Diabetes in Pregnancy Program  [address]  [address]

## 2023-02-13 ENCOUNTER — ROUTINE PRENATAL (OUTPATIENT)
Dept: OBGYN CLINIC | Facility: CLINIC | Age: 28
End: 2023-02-13

## 2023-02-13 VITALS — WEIGHT: 202.4 LBS | DIASTOLIC BLOOD PRESSURE: 66 MMHG | BODY MASS INDEX: 34.74 KG/M2 | SYSTOLIC BLOOD PRESSURE: 104 MMHG

## 2023-02-13 DIAGNOSIS — O24.410 DIET CONTROLLED GESTATIONAL DIABETES MELLITUS (GDM) IN THIRD TRIMESTER: ICD-10-CM

## 2023-02-13 DIAGNOSIS — Z3A.34 34 WEEKS GESTATION OF PREGNANCY: ICD-10-CM

## 2023-02-13 DIAGNOSIS — O09.299 HISTORY OF FORCEPS DELIVERY IN PRIOR PREGNANCY, CURRENTLY PREGNANT: ICD-10-CM

## 2023-02-13 DIAGNOSIS — Z34.93 PRENATAL CARE IN THIRD TRIMESTER: Primary | ICD-10-CM

## 2023-02-13 LAB
SL AMB  POCT GLUCOSE, UA: NORMAL
SL AMB POCT URINE PROTEIN: NORMAL

## 2023-02-13 NOTE — ASSESSMENT & PLAN NOTE
TWG 7 lbs  Goal 11-20 lbs  Following with DPP with good control with dietary change     Scheduled for f/u growth 2/27/23  No results found for: HGBA1C

## 2023-02-13 NOTE — PROGRESS NOTES
Patient here for PN visit  She denies any complaints  Good fetal movement  She has her breast pump  Urine neg for protein and glucose

## 2023-02-13 NOTE — ASSESSMENT & PLAN NOTE
Katerina Day  is a 28 y o   @34w1d who presents for routine prenatal visit  Growth scan - scheduled 23  TDAP - declined due to allergy  S/p Rhogam    GBS at next visit   Plans to breastfeed  Pump - has   Perineal massage - reviewed  Ped - has     Reports good fetal movement  Denies LOF, vaginal bleeding, regular uterine contractions, cramping, headaches or visual changes  Reviewed PTL precautions and FKC

## 2023-02-13 NOTE — PROGRESS NOTES
Problem List Items Addressed This Visit        Endocrine    Gestational diabetes mellitus (GDM) in third trimester     TWG 7 lbs  Goal 11-20 lbs  Following with DPP with good control with dietary change  Scheduled for f/u growth 23  No results found for: HGBA1C            Other    34 weeks gestation of pregnancy     Denisha Kinsey  is a 28 y o   @34w1d who presents for routine prenatal visit  Growth scan - scheduled 23  TDAP - declined due to allergy  S/p Rhogam    GBS at next visit   Plans to breastfeed  Pump - has   Perineal massage - reviewed  Ped - has     Reports good fetal movement  Denies LOF, vaginal bleeding, regular uterine contractions, cramping, headaches or visual changes  Reviewed PTL precautions and FKC             History of forceps delivery in prior pregnancy, currently pregnant   Other Visit Diagnoses     Prenatal care in third trimester    -  Primary    Relevant Orders    POCT urine dip (Completed)

## 2023-02-15 ENCOUNTER — DOCUMENTATION (OUTPATIENT)
Dept: PERINATAL CARE | Facility: CLINIC | Age: 28
End: 2023-02-15

## 2023-02-15 NOTE — PROGRESS NOTES
Date: 02/15/23  Aubrey Alexander  1995  Estimated Date of Delivery: 3/26/23  34w3d  OB/GYN:[facility]    Diagnosis:  Diet controlled GDM    Blood Sugar Logs Submitted via: Glucose Flowsheet           Suspect the hand  increase her BG after breakfast today  Assessment and Plan:  No diabetes related medications  Declined Class 2, only had Class 1   2100 calorie meal plan consisting of 3 meals and 3 snacks daily including protein at each  Advised patient to continue current meal plan  Avoid fasting for > 10 hours overnight  Continue SMBG 4 x per day (Fasting, 2 hour after start of each meal) with a One-Touch Verio  Glucose meter  If okay by physician, recommend up to 30 minutes of physical activity daily     Lab Work:   No results found for: HGBA1C     Ultrasound:       Date:1/30/23       Fetal Growth: Normal       EMELYN: Normal   Next: 2/27/23    Diabetes Self Management Support Plan outside of ongoing care: Spouse/Family   Patient Stated Goal: "I will eat 3 meals and 3 snacks each day, including protein at each"   Goal Assessment:  On track    Date to report next: Weekly     Eduardo Talbert MS, RD, CDE  Diabetes Educator  [facility] Maternal Fetal Medicine  Diabetes in Pregnancy Program  [address], [address] [place] [address],[address]  [address]

## 2023-02-27 ENCOUNTER — ULTRASOUND (OUTPATIENT)
Facility: HOSPITAL | Age: 28
End: 2023-02-27

## 2023-02-27 VITALS
WEIGHT: 202.6 LBS | HEIGHT: 64 IN | HEART RATE: 104 BPM | DIASTOLIC BLOOD PRESSURE: 64 MMHG | SYSTOLIC BLOOD PRESSURE: 112 MMHG | BODY MASS INDEX: 34.59 KG/M2

## 2023-02-27 DIAGNOSIS — Z3A.36 36 WEEKS GESTATION OF PREGNANCY: ICD-10-CM

## 2023-02-27 DIAGNOSIS — O24.410 DIET CONTROLLED GESTATIONAL DIABETES MELLITUS (GDM) IN THIRD TRIMESTER: Primary | ICD-10-CM

## 2023-02-27 NOTE — PROGRESS NOTES
The patient was seen today for an ultrasound  Please see ultrasound report (located under Ob Procedures) for additional details  Thank you very much for allowing us to participate in the care of this very nice patient  Should you have any questions, please do not hesitate to contact me  Toan Yao MD [id number] [facility]  Attending Physician, Charo

## 2023-02-28 ENCOUNTER — ROUTINE PRENATAL (OUTPATIENT)
Dept: OBGYN CLINIC | Facility: CLINIC | Age: 28
End: 2023-02-28

## 2023-02-28 VITALS
BODY MASS INDEX: 35.09 KG/M2 | SYSTOLIC BLOOD PRESSURE: 122 MMHG | HEART RATE: 112 BPM | OXYGEN SATURATION: 100 % | WEIGHT: 204.4 LBS | DIASTOLIC BLOOD PRESSURE: 72 MMHG

## 2023-02-28 DIAGNOSIS — Z34.93 PRENATAL CARE IN THIRD TRIMESTER: ICD-10-CM

## 2023-02-28 DIAGNOSIS — O09.299 HISTORY OF FORCEPS DELIVERY IN PRIOR PREGNANCY, CURRENTLY PREGNANT: ICD-10-CM

## 2023-02-28 DIAGNOSIS — Z3A.36 36 WEEKS GESTATION OF PREGNANCY: Primary | ICD-10-CM

## 2023-02-28 DIAGNOSIS — O26.899 RH NEGATIVE STATE IN ANTEPARTUM PERIOD: ICD-10-CM

## 2023-02-28 DIAGNOSIS — O24.410 DIET CONTROLLED GESTATIONAL DIABETES MELLITUS (GDM) IN THIRD TRIMESTER: ICD-10-CM

## 2023-02-28 DIAGNOSIS — Z67.91 RH NEGATIVE STATE IN ANTEPARTUM PERIOD: ICD-10-CM

## 2023-02-28 LAB
SL AMB  POCT GLUCOSE, UA: ABNORMAL
SL AMB POCT URINE PROTEIN: ABNORMAL

## 2023-02-28 NOTE — PROGRESS NOTES
36 weeks gestation of pregnancy  Kj Winston  is a 33 y o  [id number] @36w2d who presents for routine prenatal visit  Discussed option of eIOL at 39 wks with pt today  She will consider this  Aware A1GDM recommendation for delivery is delivery by 41 wks  She will start considering dates in 40th week for IOL    28 wk labs- GDM   Growth scan- completed yesterday- EFW23%, AC51%; follow up as clinically indicated  TDAP- allergy; declined  Rhogam- given on 1/5/23  GBS collected today    Reports good fetal movement  Denies LOF, vaginal bleeding, regular uterine contractions, cramping, headaches or visual changes  Reviewed PTL/Labor precautions and FKC            Rh negative state in antepartum period  Rhogam received 1/5/23    Gestational diabetes mellitus (GDM) in third trimester  Compliant with reporting sugars to DP  Growth yesterday EFW23%  Aware of recommendation for delivery by 41 wks with A1GDM diagnosis  No results found for: HGBA1C

## 2023-02-28 NOTE — ASSESSMENT & PLAN NOTE
Yung Fine  is a 33 y o  [id number] @36w2d who presents for routine prenatal visit  Discussed option of eIOL at 39 wks with pt today  She will consider this  Aware A1GDM recommendation for delivery is delivery by 41 wks  She will start considering dates in 40th week for IOL    28 wk labs- GDM   Growth scan- completed yesterday- EFW23%, AC51%; follow up as clinically indicated  TDAP- allergy; declined  Rhogam- given on 1/5/23  GBS collected today    Reports good fetal movement  Denies LOF, vaginal bleeding, regular uterine contractions, cramping, headaches or visual changes  Reviewed PTL/Labor precautions and FKC

## 2023-02-28 NOTE — ASSESSMENT & PLAN NOTE
Compliant with reporting sugars to DP  Growth yesterday EFW23%  Aware of recommendation for delivery by 41 wks with A1GDM diagnosis  No results found for: HGBA1C

## 2023-03-02 LAB — GP B STREP DNA SPEC QL NAA+PROBE: NEGATIVE

## 2023-03-06 ENCOUNTER — DOCUMENTATION (OUTPATIENT)
Dept: PERINATAL CARE | Facility: CLINIC | Age: 28
End: 2023-03-06

## 2023-03-06 NOTE — PROGRESS NOTES
Date: 03/06/23  Chucky Oliva  1995  Estimated Date of Delivery: 3/26/23  37w1d  OB/GYN:[facility]    Diagnosis:  Diet controlled GDM    Blood Sugar Logs Submitted via: Glucose Flowsheet           Assessment and Plan:  No diabetes related medications  Declined Class 2, only had Class 1   2100 calorie meal plan consisting of 3 meals and 3 snacks daily including protein at each  Advised patient to continue current meal plan  Avoid fasting for > 10 hours overnight  Continue SMBG 4 x per day (Fasting, 2 hour after start of each meal) with a One-Touch Verio  Glucose meter  If okay by physician, recommend up to 30 minutes of physical activity daily     Lab Work:   No results found for: HGBA1C     Ultrasound:       Date:2/27/23       Fetal Growth: Normal       EMELYN: Normal   Next: None    Diabetes Self Management Support Plan outside of ongoing care: Spouse/Family   Patient Stated Goal: "I will eat 3 meals and 3 snacks each day, including protein at each"   Goal Assessment:  On track    Date to report next: Weekly     Kyra Duvall, MS, RD, CDE  Diabetes Educator  [facility] Maternal Fetal Medicine  Diabetes in Pregnancy Program  [address] [place] [address],[address]  [address]

## 2023-03-09 ENCOUNTER — ROUTINE PRENATAL (OUTPATIENT)
Dept: OBGYN CLINIC | Facility: MEDICAL CENTER | Age: 28
End: 2023-03-09

## 2023-03-09 VITALS
WEIGHT: 201.8 LBS | HEIGHT: 64 IN | SYSTOLIC BLOOD PRESSURE: 110 MMHG | DIASTOLIC BLOOD PRESSURE: 72 MMHG | BODY MASS INDEX: 34.45 KG/M2 | HEART RATE: 102 BPM

## 2023-03-09 DIAGNOSIS — Z34.83 PRENATAL CARE, SUBSEQUENT PREGNANCY IN THIRD TRIMESTER: Primary | ICD-10-CM

## 2023-03-09 DIAGNOSIS — O24.410 DIET CONTROLLED GESTATIONAL DIABETES MELLITUS (GDM) IN THIRD TRIMESTER: ICD-10-CM

## 2023-03-09 LAB
SL AMB  POCT GLUCOSE, UA: ABNORMAL
SL AMB POCT URINE PROTEIN: ABNORMAL

## 2023-03-09 NOTE — PROGRESS NOTES
33 y o   at 37w4d presents for routine prenatal visit  She denies contractions/leakage of fluid/vaginal bleeding  She feels good fetal movement  Some more headaches  - mild and resolve spontaneously  Advised to call if more severe or unresolving    Problem List Items Addressed This Visit        Endocrine    Gestational diabetes mellitus (GDM) in third trimester   EFW 23%  Follows w/ DP, well controlled on diet and exercise  Delivery recommended 39-40 6/7 wga   Other Visit Diagnoses     Prenatal care, subsequent pregnancy in third trimester    -  Primary  Hoping for spontaneous labor  F/u in 1 wk or prn    Relevant Orders    POCT urine dip

## 2023-03-09 NOTE — PROGRESS NOTES
Patient presents for a routine prenatal visit    37W4D  Good Fetal Movement  No LOF,bleeding, or discharge  Has some contractions, but nothing to time  No current complaints at this time       UTD on Rhogam, allergic to Tdap  Delivery consent is signed  GBS neg    Urine: trace/-

## 2023-03-15 ENCOUNTER — ROUTINE PRENATAL (OUTPATIENT)
Dept: OBGYN CLINIC | Facility: CLINIC | Age: 28
End: 2023-03-15

## 2023-03-15 ENCOUNTER — DOCUMENTATION (OUTPATIENT)
Dept: PERINATAL CARE | Facility: CLINIC | Age: 28
End: 2023-03-15

## 2023-03-15 ENCOUNTER — TELEPHONE (OUTPATIENT)
Dept: OBGYN CLINIC | Facility: CLINIC | Age: 28
End: 2023-03-15

## 2023-03-15 VITALS
BODY MASS INDEX: 34.5 KG/M2 | HEART RATE: 105 BPM | SYSTOLIC BLOOD PRESSURE: 100 MMHG | DIASTOLIC BLOOD PRESSURE: 62 MMHG | WEIGHT: 201 LBS

## 2023-03-15 DIAGNOSIS — Z34.83 PRENATAL CARE, SUBSEQUENT PREGNANCY IN THIRD TRIMESTER: Primary | ICD-10-CM

## 2023-03-15 DIAGNOSIS — O26.899 RH NEGATIVE STATE IN ANTEPARTUM PERIOD: ICD-10-CM

## 2023-03-15 DIAGNOSIS — Z3A.38 38 WEEKS GESTATION OF PREGNANCY: ICD-10-CM

## 2023-03-15 DIAGNOSIS — Z67.91 RH NEGATIVE STATE IN ANTEPARTUM PERIOD: ICD-10-CM

## 2023-03-15 DIAGNOSIS — O24.410 DIET CONTROLLED GESTATIONAL DIABETES MELLITUS (GDM) IN THIRD TRIMESTER: ICD-10-CM

## 2023-03-15 LAB
SL AMB  POCT GLUCOSE, UA: NEGATIVE
SL AMB POCT URINE PROTEIN: NEGATIVE

## 2023-03-15 NOTE — ASSESSMENT & PLAN NOTE
Continues with good control with diet  Discussed pros/cons of 39 week induction - would like late next week     No results found for: HGBA1C

## 2023-03-15 NOTE — TELEPHONE ENCOUNTER
Patient prefers 3/23 and 3/24  Hoping to not need induction  Call to L&D, nothing available those dates  Patient needs a morning slot  Will call back again tomorrow for openings

## 2023-03-15 NOTE — TELEPHONE ENCOUNTER
----- Message from Kenneth Linares MD sent at 3/15/2023  1:56 PM EDT -----  Regarding: GDM induction scheduling  Procedure to be scheduled (IOL or CS): IOL    CONCEPCIÓN: Estimated Date of Delivery: 3/26/23     Indication for delivery: GDMA1    Requested date (s) of delivery: 3/22 to 3/24 - am slot   If requested date is unavailable, is there a date by which the pt must be delivered?  4/2    Physician preference: n/a    If IOL, anticipated method: pit/blevins (but VERY soft, so am slot)

## 2023-03-15 NOTE — PROGRESS NOTES
Prenatal visit at 45 3/7wks  Denies LOF, VB, and CTX  Good FM  Discussed pros/cons of induction  Patient wishes to have induction later next week  Cervix closed but VERY sort and thin  Suspect will dilate rapidly with blevins so will schedule for am induction  All questions answered  Problem List Items Addressed This Visit        Endocrine    Gestational diabetes mellitus (GDM) in third trimester     Continues with good control with diet  Discussed pros/cons of 39 week induction - would like late next week  No results found for: HGBA1C            Other    38 weeks gestation of pregnancy     IOL to be scheduled - will have patient return next week for final discussion/consent  Labor precautions and kick counts reviewed  Rh negative state in antepartum period     Received Rhogam at 28 weeks  Will check baby's blood type postpartum to determine if additional dose necessary           Other Visit Diagnoses     Prenatal care, subsequent pregnancy in third trimester    -  Primary    Relevant Orders    POCT urine dip (Completed)

## 2023-03-15 NOTE — ASSESSMENT & PLAN NOTE
Received Rhogam at 28 weeks  Will check baby's blood type postpartum to determine if additional dose necessary

## 2023-03-15 NOTE — ASSESSMENT & PLAN NOTE
IOL to be scheduled - will have patient return next week for final discussion/consent  Labor precautions and kick counts reviewed

## 2023-03-15 NOTE — PROGRESS NOTES
Date: 03/15/23  Young Moreno  1995  Estimated Date of Delivery: 3/26/23  38w3d  OB/GYN:[facility]    Diagnosis:  Diet controlled GDM    Blood Sugar Logs Submitted via: Glucose Flowsheet               Assessment and Plan:  No diabetes related medications  Declined Class 2, only had Class 1   2100 calorie meal plan consisting of 3 meals and 3 snacks daily including protein at each  Advised patient to continue current meal plan  Avoid fasting for > 10 hours overnight  Continue SMBG 4 x per day (Fasting, 2 hour after start of each meal) with a One-Touch Verio  Glucose meter  If okay by physician, recommend up to 30 minutes of physical activity daily     Lab Work:   No results found for: HGBA1C     Ultrasound:       Date:2/27/23       Fetal Growth: Normal       EMELYN: Normal   Next: None    Diabetes Self Management Support Plan outside of ongoing care: Spouse/Family   Patient Stated Goal: "I will eat 3 meals and 3 snacks each day, including protein at each"   Goal Assessment:  On track    Date to report next: Weekly     Roxanne Segura, MS, RD, CDE  Diabetes Educator  [facility]etal [facility]  Diabetes in Pregnancy Program  [address] [place] [address],[address]  [address]

## 2023-03-17 ENCOUNTER — TELEPHONE (OUTPATIENT)
Dept: OBGYN CLINIC | Facility: CLINIC | Age: 28
End: 2023-03-17

## 2023-03-17 ENCOUNTER — HOSPITAL ENCOUNTER (OUTPATIENT)
Facility: HOSPITAL | Age: 28
Discharge: HOME | End: 2023-03-17
Attending: STUDENT IN AN ORGANIZED HEALTH CARE EDUCATION/TRAINING PROGRAM | Admitting: STUDENT IN AN ORGANIZED HEALTH CARE EDUCATION/TRAINING PROGRAM

## 2023-03-17 VITALS
HEART RATE: 106 BPM | DIASTOLIC BLOOD PRESSURE: 77 MMHG | RESPIRATION RATE: 20 BRPM | TEMPERATURE: 98.8 F | SYSTOLIC BLOOD PRESSURE: 118 MMHG

## 2023-03-17 NOTE — PROGRESS NOTES
L&D Triage Note - OB/GYN  Shira Patino 33 y o  female MRN: [id number]  Unit/Bed#: LD TRIAGE 2 Encounter: [id number]      ASSESSMENT:    Shira Patino is a 33 y o   at 38w5d presents after having some spotting while wiping this morning  No active bleeding on SSE  FHT is reactive and reassuring  SVE unchanged  Low suspicion for fetal distress or labor at this time  Suitable for discharge home with precautions  PLAN:    1) spotting   -SSE: no active bleeding, small amount of dark red blood in vaginal vault, cervical os appears closed   -SVE: 0/50/-4   -FHT: reactive   -[place]: 2-3 min, patient not feeling contractions    2) Continue routine prenatal care  3) Discharge from [facility] triage with term labor precautions    - Reviewed rupture of membranes, false vs true labor, decreased fetal movement, and vaginal bleeding   - Pt to call provider with any concerns and follow up at her next scheduled prenatal appointment    - Case discussed with Dr Britney Parker:    Sihra Patino 33 y o  Tete John at 38w5d with an Estimated Date of Delivery: 3/26/23 She comes in after noticing some spotting while wiping this morning when she woke up around 0700  It has decreased since  When wiping here in triage there was just a little pink discharge  Otherwise she feels well  Denies contractions or LOF  She endorses good FM       Her current obstetrical history is significant for A1GDM    Contractions: denies  Leakage of fluid: denies  Vaginal Bleeding: as per HPI  Fetal movement: present    OBJECTIVE:    Vitals:    23 0943   BP: 118/77   Pulse: (!) 106   Resp: 20   Temp: 98 8 °F (37 1 °C)       ROS:  Constitutional: Negative  Respiratory: Negative  Cardiovascular: Negative    Gastrointestinal: Negative    General Physical Exam:  General: in no apparent distress, alert and cooperative  Cardiovascular: Cor RRR  Lungs: non-labored breathing  Abdomen: abdomen is soft without significant tenderness, masses, organomegaly or guarding  Lower extremeties: nontender    Cervical Exam  Speculum: Cervical os appears closed, dark red blood in vaginal vault, no apparent active bleeding  SVE: 0 / 50% / -4    Fetal monitoring:  FHT:  145 bpm/ Moderate 6 - 25 bpm / 15 x 15 accelerations present, no decelerations  [place]: 2-3min, patient     Philip Maloney MD  OBGYN PGY-1  3/17/2023 10:48 AM

## 2023-03-17 NOTE — TELEPHONE ENCOUNTER
Pt scheduled for IOL, medical , GDMA1, Wednesday, 3/22/23 at 0600  Routed to [facility], on call Dr Russ Watts

## 2023-03-17 NOTE — TELEPHONE ENCOUNTER
Spoke to patient and woke up- used bathroom and had significant red bleeding and is now spotting pink  Cramping yesterday-   No loss of fluid  She had an exam 3 days ago-  Unsure on FM she just woke up but baby was moving in middle of night and woke her up   Rh NEG      She has IOl scheduled for Wed-     TT to ADRYAN vela in a few min to let her know I sent patient to L&D- patient said her ETA is approx 9am

## 2023-03-20 ENCOUNTER — ANESTHESIA EVENT (INPATIENT)
Dept: ANESTHESIOLOGY | Facility: HOSPITAL | Age: 28
End: 2023-03-20

## 2023-03-20 ENCOUNTER — HOSPITAL ENCOUNTER (INPATIENT)
Facility: HOSPITAL | Age: 28
LOS: 1 days | Discharge: HOME | End: 2023-03-21
Attending: STUDENT IN AN ORGANIZED HEALTH CARE EDUCATION/TRAINING PROGRAM | Admitting: STUDENT IN AN ORGANIZED HEALTH CARE EDUCATION/TRAINING PROGRAM

## 2023-03-20 ENCOUNTER — ANESTHESIA (INPATIENT)
Dept: ANESTHESIOLOGY | Facility: HOSPITAL | Age: 28
End: 2023-03-20

## 2023-03-20 DIAGNOSIS — Z3A.38 38 WEEKS GESTATION OF PREGNANCY: Primary | ICD-10-CM

## 2023-03-20 PROBLEM — Z3A.39 39 WEEKS GESTATION OF PREGNANCY: Status: ACTIVE | Noted: 2022-09-22

## 2023-03-20 LAB
ABO GROUP BLD: NORMAL
BASE EXCESS BLDCOA CALC-SCNC: -3.4 MMOL/L (ref 3–11)
BASE EXCESS BLDCOV CALC-SCNC: -2 MMOL/L (ref 1–9)
BLD GP AB SCN SERPL QL: NEGATIVE
ERYTHROCYTE [DISTWIDTH] IN BLOOD BY AUTOMATED COUNT: 15.7 % (ref 11.6–15.1)
GLUCOSE SERPL-MCNC: 79 MG/DL (ref 65–140)
GLUCOSE SERPL-MCNC: 82 MG/DL (ref 65–140)
HCO3 BLDCOA-SCNC: 18.9 MMOL/L (ref 17.3–27.3)
HCO3 BLDCOV-SCNC: 20.7 MMOL/L (ref 12.2–28.6)
HCT VFR BLD AUTO: 36.5 % (ref 34.8–46.1)
HGB BLD-MCNC: 11.9 G/DL (ref 11.5–15.4)
HOLD SPECIMEN: NORMAL
MCH RBC QN AUTO: 26.6 PG (ref 26.8–34.3)
MCHC RBC AUTO-ENTMCNC: 32.6 G/DL (ref 31.4–37.4)
MCV RBC AUTO: 82 FL (ref 82–98)
O2 CT VFR BLDCOA CALC: 19 ML/DL
OXYHGB MFR BLDCOA: 78.1 %
OXYHGB MFR BLDCOV: 81.8 %
PCO2 BLDCOA: 28.2 MM[HG] (ref 30–60)
PCO2 BLDCOV: 30.7 MM HG (ref 27–43)
PH BLDCOA: 7.44 [PH] (ref 7.23–7.43)
PH BLDCOV: 7.45 [PH] (ref 7.19–7.49)
PLATELET # BLD AUTO: 198 THOUSANDS/UL (ref 149–390)
PMV BLD AUTO: 9.2 FL (ref 8.9–12.7)
PO2 BLDCOA: 31.7 MM HG (ref 5–25)
PO2 BLDCOV: 34.4 MM HG (ref 15–45)
RBC # BLD AUTO: 4.47 MILLION/UL (ref 3.81–5.12)
RH BLD: NEGATIVE
SAO2 % BLDCOV: 20 ML/DL
SPECIMEN EXPIRATION DATE: NORMAL
TREPONEMA PALLIDUM IGG+IGM AB [PRESENCE] IN SERUM OR PLASMA BY IMMUNOASSAY: NORMAL
WBC # BLD AUTO: 12.05 THOUSAND/UL (ref 4.31–10.16)

## 2023-03-20 PROCEDURE — 0HQ9XZZ REPAIR PERINEUM SKIN, EXTERNAL APPROACH: ICD-10-PCS | Performed by: STUDENT IN AN ORGANIZED HEALTH CARE EDUCATION/TRAINING PROGRAM

## 2023-03-20 PROCEDURE — 10907ZC DRAINAGE OF AMNIOTIC FLUID, THERAPEUTIC FROM PRODUCTS OF CONCEPTION, VIA NATURAL OR ARTIFICIAL OPENING: ICD-10-PCS | Performed by: STUDENT IN AN ORGANIZED HEALTH CARE EDUCATION/TRAINING PROGRAM

## 2023-03-20 PROCEDURE — 4A1HXCZ MONITORING OF PRODUCTS OF CONCEPTION, CARDIAC RATE, EXTERNAL APPROACH: ICD-10-PCS | Performed by: STUDENT IN AN ORGANIZED HEALTH CARE EDUCATION/TRAINING PROGRAM

## 2023-03-20 RX ORDER — OXYTOCIN/RINGER'S LACTATE 30/500 ML
PLASTIC BAG, INJECTION (ML) INTRAVENOUS
Status: COMPLETED
Start: 2023-03-20 | End: 2023-03-20

## 2023-03-20 RX ORDER — OXYTOCIN/RINGER'S LACTATE 30/500 ML
250 PLASTIC BAG, INJECTION (ML) INTRAVENOUS ONCE
Status: COMPLETED | OUTPATIENT
Start: 2023-03-20 | End: 2023-03-20

## 2023-03-20 RX ORDER — BUPIVACAINE HYDROCHLORIDE 2.5 MG/ML
INJECTION, SOLUTION EPIDURAL; INFILTRATION; INTRACAUDAL AS NEEDED
Status: DISCONTINUED | OUTPATIENT
Start: 2023-03-20 | End: 2023-03-21 | Stop reason: HOSPADM

## 2023-03-20 RX ORDER — LIDOCAINE HYDROCHLORIDE AND EPINEPHRINE 15; 5 MG/ML; UG/ML
INJECTION, SOLUTION EPIDURAL AS NEEDED
Status: DISCONTINUED | OUTPATIENT
Start: 2023-03-20 | End: 2023-03-21 | Stop reason: HOSPADM

## 2023-03-20 RX ORDER — SODIUM CHLORIDE 9 MG/ML
125 INJECTION, SOLUTION INTRAVENOUS CONTINUOUS
Status: DISCONTINUED | OUTPATIENT
Start: 2023-03-20 | End: 2023-03-20

## 2023-03-20 RX ORDER — BUPIVACAINE HYDROCHLORIDE 2.5 MG/ML
30 INJECTION, SOLUTION EPIDURAL; INFILTRATION; INTRACAUDAL ONCE AS NEEDED
Status: DISCONTINUED | OUTPATIENT
Start: 2023-03-20 | End: 2023-03-20

## 2023-03-20 RX ORDER — ONDANSETRON 2 MG/ML
4 INJECTION INTRAMUSCULAR; INTRAVENOUS EVERY 8 HOURS PRN
Status: DISCONTINUED | OUTPATIENT
Start: 2023-03-20 | End: 2023-03-21 | Stop reason: HOSPADM

## 2023-03-20 RX ORDER — ACETAMINOPHEN 325 MG/1
650 TABLET ORAL EVERY 4 HOURS PRN
Status: DISCONTINUED | OUTPATIENT
Start: 2023-03-20 | End: 2023-03-21 | Stop reason: HOSPADM

## 2023-03-20 RX ORDER — ACETAMINOPHEN 325 MG/1
650 TABLET ORAL EVERY 6 HOURS PRN
Status: DISCONTINUED | OUTPATIENT
Start: 2023-03-20 | End: 2023-03-20

## 2023-03-20 RX ORDER — DIPHENHYDRAMINE HCL 25 MG
25 TABLET ORAL EVERY 6 HOURS PRN
Status: DISCONTINUED | OUTPATIENT
Start: 2023-03-20 | End: 2023-03-21 | Stop reason: HOSPADM

## 2023-03-20 RX ORDER — ONDANSETRON 2 MG/ML
4 INJECTION INTRAMUSCULAR; INTRAVENOUS EVERY 4 HOURS PRN
Status: DISCONTINUED | OUTPATIENT
Start: 2023-03-20 | End: 2023-03-20

## 2023-03-20 RX ORDER — CALCIUM CARBONATE 200(500)MG
500 TABLET,CHEWABLE ORAL 3 TIMES DAILY PRN
Status: DISCONTINUED | OUTPATIENT
Start: 2023-03-20 | End: 2023-03-20

## 2023-03-20 RX ORDER — CALCIUM CARBONATE 200(500)MG
1000 TABLET,CHEWABLE ORAL DAILY PRN
Status: DISCONTINUED | OUTPATIENT
Start: 2023-03-20 | End: 2023-03-21 | Stop reason: HOSPADM

## 2023-03-20 RX ORDER — DIAPER,BRIEF,INFANT-TODD,DISP
1 EACH MISCELLANEOUS DAILY PRN
Status: DISCONTINUED | OUTPATIENT
Start: 2023-03-20 | End: 2023-03-21 | Stop reason: HOSPADM

## 2023-03-20 RX ORDER — DOCUSATE SODIUM 100 MG/1
100 CAPSULE, LIQUID FILLED ORAL 2 TIMES DAILY
Status: DISCONTINUED | OUTPATIENT
Start: 2023-03-20 | End: 2023-03-21 | Stop reason: HOSPADM

## 2023-03-20 RX ORDER — IBUPROFEN 600 MG/1
600 TABLET ORAL EVERY 6 HOURS
Status: DISCONTINUED | OUTPATIENT
Start: 2023-03-20 | End: 2023-03-21 | Stop reason: HOSPADM

## 2023-03-20 RX ADMIN — Medication 1 APPLICATION.: at 12:53

## 2023-03-20 RX ADMIN — IBUPROFEN 600 MG: 600 TABLET, FILM COATED ORAL at 23:54

## 2023-03-20 RX ADMIN — HYDROCORTISONE 1 APPLICATION.: 10 CREAM TOPICAL at 12:53

## 2023-03-20 RX ADMIN — Medication 250 MILLI-UNITS/MIN: at 10:52

## 2023-03-20 RX ADMIN — DOCUSATE SODIUM 100 MG: 100 CAPSULE, LIQUID FILLED ORAL at 11:51

## 2023-03-20 RX ADMIN — WITCH HAZEL 1 PAD.: 500 SOLUTION RECTAL; TOPICAL at 12:53

## 2023-03-20 RX ADMIN — DOCUSATE SODIUM 100 MG: 100 CAPSULE, LIQUID FILLED ORAL at 17:44

## 2023-03-20 RX ADMIN — LIDOCAINE HYDROCHLORIDE AND EPINEPHRINE 3 ML: 15; 5 INJECTION, SOLUTION EPIDURAL at 08:18

## 2023-03-20 RX ADMIN — IBUPROFEN 600 MG: 600 TABLET, FILM COATED ORAL at 17:43

## 2023-03-20 RX ADMIN — ROPIVACAINE HYDROCHLORIDE: 2 INJECTION, SOLUTION EPIDURAL; INFILTRATION at 08:20

## 2023-03-20 RX ADMIN — BUPIVACAINE HYDROCHLORIDE 1 ML: 2.5 INJECTION, SOLUTION EPIDURAL; INFILTRATION; INTRACAUDAL; PERINEURAL at 08:16

## 2023-03-20 RX ADMIN — SODIUM CHLORIDE 125 ML/HR: 0.9 INJECTION, SOLUTION INTRAVENOUS at 08:23

## 2023-03-20 RX ADMIN — IBUPROFEN 600 MG: 600 TABLET, FILM COATED ORAL at 11:51

## 2023-03-20 RX ADMIN — SODIUM CHLORIDE 999 ML/HR: 0.9 INJECTION, SOLUTION INTRAVENOUS at 07:22

## 2023-03-20 NOTE — ASSESSMENT & PLAN NOTE
No results found for: HGBA1C    Recent Labs     03/20/23  0657   POCGLU 79       Blood Sugar Average: Last 72 hrs:

## 2023-03-20 NOTE — H&P
[address] Katey Engle 33 y o  female MRN: [id number]  Unit/Bed#: -01 Encounter: [id number]    Assessment: 33 y o   at 39w1d admitted for labor   SVE: 6 5/80/-3  FHT: reactive  Clinical EFW: 23% ; Cephalic confirmed by TAUS  GBS status: negative     Plan:   * 39 weeks gestation of pregnancy  Assessment & Plan  Admit   T&S, CBC, RPR  CLD  IV fluids  GBS prophylaxis is not needed  Expectant management  Epidural per patient request      Rh negative state in antepartum period  Assessment & Plan  Rhogam panel postpartum    Gestational diabetes mellitus (GDM) in third trimester  Assessment & Plan  No results found for: HGBA1C    Recent Labs     23  0657   POCGLU 79       Blood Sugar Average: Last 72 hrs:        Discussed case and plan w/ Dr Chao Jansen      Chief Complaint: contractions    HPI: Mercedes Holman is a 33 y o  Lula Dieter with an CONCEPCIÓN of 3/26/2023, by Last Menstrual Period at 36w3d who is being admitted for labor   She began estefany at 0100 this morning every 7-8 minutes  Contractions have gotten stronger and closer together  She was 5/80/-3 in triage and felt contractions were getting stronger and asked to be rechecked  On repeat SVE, patient noted to be 6 5/80/-3  She has no LOF, and reports no VB  She states she has felt good FM  Patient Active Problem List   Diagnosis   • Anxiety   • 39 weeks gestation of pregnancy   • Cyclic vomiting syndrome   • History of forceps delivery in prior pregnancy, currently pregnant   • Proteinuria affecting pregnancy in second trimester   • COVID-19 affecting pregnancy in second trimester   • Gestational diabetes mellitus (GDM) in third trimester   • Rh negative state in antepartum period       Baby complications/comments: none apparent    Review of Systems   Constitutional: Negative for chills and fever  HENT: Negative for ear pain and sore throat  Eyes: Negative for pain and visual disturbance     Respiratory: Negative for cough and shortness of breath  Cardiovascular: Negative for chest pain and palpitations  Gastrointestinal: Positive for abdominal pain (with contractions)  Negative for constipation, diarrhea, nausea and vomiting  Genitourinary: Positive for frequency  Negative for dysuria, hematuria and vaginal bleeding  Musculoskeletal: Negative for arthralgias and back pain  Skin: Negative for color change and rash  Neurological: Negative for light-headedness and headaches  All other systems reviewed and are negative        OB Hx:  OB History    Para Term  AB Living   3 1 1 0 1 1   SAB IAB Ectopic Multiple Live Births   1 0 0 0 1      # Outcome Date GA Lbr Seth/2nd Weight Sex Delivery Anes PTL Lv   3 Current            2 Term 19 39w5d  2920 g (6 lb 7 oz) F Vag-Forceps EPI  PERRI   1 SAB 2015 5w6d             Obstetric Comments   1 miscarriage in        Past Medical Hx:  Past Medical History:   Diagnosis Date   • Cyclic vomiting syndrome    • Migraine     hx of   • Miscarriage     2015 NO D & C   • Rh incompatibility    • Scoliosis    • Varicella     had vaccines   • Visual impairment     contacts       Past Surgical hx:  Past Surgical History:   Procedure Laterality Date   • WISDOM TOOTH EXTRACTION Bilateral        Social Hx:  Social History     Tobacco Use   • Smoking status: Never   • Smokeless tobacco: Never   Vaping Use   • Vaping Use: Never used   Substance Use Topics   • Alcohol use: No     Comment: social-not during preg   • Drug use: No     Comment: denies self of FOB-denies family use         Allergies   Allergen Reactions   • Penicillins Hives     As child-unknown reaction   • Pertussis Vaccines      Screamed 5+ hours as child, did not get further dosing    • Metoclopramide Anxiety and Chest Pain       Medications Prior to Admission   Medication   • Prenatal Vit-Fe Fumarate-FA (PRENATAL COMPLETE PO)   • acetaminophen (TYLENOL) 325 mg tablet   • Blood Glucose Monitoring Suppl (OneTouch Verio Flex System) w/Device KIT   • OneTouch Delica Lancets 31A MISC   • OneTouch Verio test strip       Objective:  Temp:  [98 °F (36 7 °C)] 98 °F (36 7 °C)  HR:  [88] 88  Resp:  [16] 16  BP: (112)/(77) 112/77  Body mass index is 34 5 kg/m²  Physical Exam:  Physical Exam  Constitutional:       General: She is not in acute distress  Appearance: Normal appearance  She is not ill-appearing  HENT:      Head: Normocephalic and atraumatic  Mouth/Throat:      Mouth: Mucous membranes are moist       Pharynx: Oropharynx is clear  Cardiovascular:      Rate and Rhythm: Normal rate and regular rhythm  Pulses: Normal pulses  Heart sounds: Normal heart sounds  Pulmonary:      Effort: Pulmonary effort is normal  No respiratory distress  Breath sounds: Normal breath sounds  Abdominal:      Palpations: Abdomen is soft  Comments: gravid   Neurological:      General: No focal deficit present  Mental Status: She is alert and oriented to person, place, and time  Mental status is at baseline  Skin:     General: Skin is warm and dry  Capillary Refill: Capillary refill takes less than 2 seconds  Vitals reviewed              FHT:  Baseline Rate: 140 bpm  Variability: Moderate 6-25 bpm  Accelerations: 15 x 15 or greater  Decelerations: None  FHR Category: Category I    TOCO:   Contraction Frequency (minutes): 1-5  Contraction Duration (seconds): 60-90  Contraction Quality: Mild    Lab Results   Component Value Date    WBC 12 05 (H) 03/20/2023    HGB 11 9 03/20/2023    HCT 36 5 03/20/2023     03/20/2023     Lab Results   Component Value Date    K 3 4 (L) 04/23/2016     04/23/2016    CO2 22 04/23/2016    BUN 9 04/23/2016    CREATININE 0 61 04/23/2016    GLUCOSE 86 04/23/2016    AST 12 04/23/2016    ALT 31 04/23/2016     Prenatal Labs: Reviewed      Blood type: O neg  Antibody: pending  GBS: neg  HIV: non-reactive  Rubella: immune  Syphilis IgM/IgG: non-reactive  HBsAg: non-reactive  HCAb: non-reactive  Chlamydia: negative  Gonorrhea: negative  Diabetes 1 hour screen: 171  3 hour glucose: 131, 181, 188, 82  Platelets: 313  Hgb: 11 9  >2 Midnights  INPATIENT     Signature/Title: Barb Rinaldi MD  Date: 3/20/2023  Time: 7:10 AM

## 2023-03-20 NOTE — ANESTHESIA POSTPROCEDURE EVALUATION
Post-Op Assessment Note    CV Status:  Stable  Pain Score: 0    Pain management: adequate     Mental Status:  Alert and awake   Hydration Status:  Euvolemic   PONV Controlled:  Controlled   Airway Patency:  Patent      Post Op Vitals Reviewed: Yes      Staff: CRNA         No notable events documented      /63 (03/20/23 1246)    Temp      Pulse 78 (03/20/23 1246)   Resp      SpO2

## 2023-03-20 NOTE — ANESTHESIA PROCEDURE NOTES
CSE Block    Patient location during procedure: OB  Start time: 3/20/2023 8:12 AM  Reason for block: procedure for pain and at surgeon's request  Staffing  Performed: Anesthesiologist   Anesthesiologist: Ivonne Burris MD  Resident/CRNA: Britt Lomeli CRNA  Preanesthetic Checklist  Completed: patient identified, IV checked, site marked, risks and benefits discussed, surgical consent, monitors and equipment checked, pre-op evaluation and timeout performed  CSE  Patient position: sitting  Prep: ChloraPrep  Patient monitoring: continuous pulse ox, heart rate and frequent blood pressure checks  Approach: midline  Spinal Needle  Needle type: pencil-tip   Needle gauge: 27 G  Needle length: 10 cm  Needle insertion depth: 13 cm  Epidural Needle  Injection technique: NAVIN saline  Needle type: Tuohy   Needle gauge: 18 G  Needle length: 9 cm  Needle insertion depth: 7 cm  Location: L4-L5  Catheter  Catheter type: side hole  Catheter size: 20 G  Catheter at skin depth: 12 cm  Catheter securement method: stabilization device  Test dose: negative  Assessment  Sensory level: T10  Events: cerebrospinal fluid  Injection Assessment:  negative aspiration for heme, no paresthesia on injection, positive aspiration for clear CSF and no pain on injection

## 2023-03-20 NOTE — ASSESSMENT & PLAN NOTE
Admit   T&S, CBC, RPR  CLD  IV fluids  GBS prophylaxis is not needed  Expectant management  Epidural per patient request

## 2023-03-20 NOTE — OB LABOR/OXYTOCIN SAFETY PROGRESS
Labor Progress Note - Christia Milagros 33 y o  female MRN: [id number]    Unit/Bed#: -01 Encounter: [id number]       Contraction Frequency (minutes): 1-5  Contraction Quality: Mild  Tachysystole: No   Cervical Dilation: 8        Cervical Effacement: 90  Fetal Station: -1  Baseline Rate: 140 bpm  Fetal Heart Rate: 142 BPM  FHR Category: Category I               Vital Signs:   Vitals:    03/20/23 0453   BP: 112/77   Pulse: 88   Resp:    Temp:        Notes/comments:   Patient becoming uncomfortable with contractions  SVE as above   She will get her epidural   D/w Dr Mary Delgado MD 3/20/2023 7:58 AM

## 2023-03-20 NOTE — OB LABOR/OXYTOCIN SAFETY PROGRESS
Labor Progress Note - Bucky Proffer 33 y o  female MRN: [id number]    Unit/Bed#: -01 Encounter: [id number]       Contraction Frequency (minutes): 3-5  Contraction Quality: Moderate  Tachysystole: No   Cervical Dilation: Lip/rim (Comment)        Cervical Effacement: 100  Fetal Station: 0  Baseline Rate: 135 bpm  Fetal Heart Rate: 143 BPM  FHR Category: Category I               Vital Signs:   Vitals:    03/20/23 0948   BP: 110/75   Pulse: 76   Resp:    Temp:    SpO2:        Notes/comments:   Patient more comfortable after epidural  Still feeling pressure  SVE as above  Tracing category 1       Daniela Murphy MD 3/20/2023 9:58 AM

## 2023-03-20 NOTE — PLAN OF CARE
Problem: POSTPARTUM  Goal: Experiences normal postpartum course  Description: INTERVENTIONS:  - Monitor maternal vital signs  - Assess uterine involution and lochia  Outcome: Progressing     Problem: POSTPARTUM  Goal: Appropriate maternal -  bonding  Description: INTERVENTIONS:  - Identify family support  - Assess for appropriate maternal/infant bonding   -Encourage maternal/infant bonding opportunities  - Referral to  or  as needed  Outcome: Progressing     Problem: POSTPARTUM  Goal: Establishment of infant feeding pattern  Description: INTERVENTIONS:  - Assess breast/bottle feeding  - Refer to lactation as needed  Outcome: Progressing     Problem: POSTPARTUM  Goal: Incision(s), wounds(s) or drain site(s) healing without S/S of infection  Description: INTERVENTIONS  - Assess and document dressing, incision, wound bed, drain sites and surrounding tissue  - Provide patient and family education  Outcome: Progressing     Problem: ALTERATION IN THE BREAST  Goal: Optimize infant feeding at the breast  Description: INTERVENTIONS:  - Latch, breast and nipple assessment  - Assess prior breast feeding history  - Hand expression of breast milk  - For cracked, bleeding and or sore nipples reassess latch, treat damaged nipple  -Educate mother on feeding cues  -Positioning/latch techniques  Outcome: Progressing     Problem: INADEQUATE LATCH, SUCK OR SWALLOW  Goal: Demonstrate ability to latch and sustain latch, audible swallowing and satiety  Description: INTERVENTIONS:  - Assess oral anatomy, notify Physician/AP for abnormal findings  - Establish milk expression  - Maximize feeding opportunity (skin to skin, behavioral state)  - Position/latch techniques  - Discourage use of pacifier-artificial nipple  - Mechanical pumping  - Nipple Shield  - Supplemental formula feeding (Physician/AP order)  - Alternative feeding method  Outcome: Progressing

## 2023-03-20 NOTE — DISCHARGE INSTRUCTIONS
Vaginal Delivery   WHAT YOU SHOULD KNOW:   A vaginal delivery is the birth of your baby through your vagina (birth canal)  AFTER YOU LEAVE:   Medicines:  NSAIDs  help decrease swelling and pain or fever  This medicine is available with or without a doctor's order  NSAIDs can cause stomach bleeding or kidney problems in certain people  If you take blood thinner medicine, always ask your healthcare provider if NSAIDs are safe for you  Always read the medicine label and follow directions  Take your medicine as directed  Call your healthcare provider if you think your medicine is not helping or if you have side effects  Tell him if you are allergic to any medicine  Keep a list of the medicines, vitamins, and herbs you take  Include the amounts, and when and why you take them  Bring the list or the pill bottles to follow-up visits  Carry your medicine list with you in case of an emergency  Follow up with your primary healthcare provider:  Most women need to return 6 weeks after a vaginal delivery  Ask about how to care for your wounds or stitches  Write down your questions so you remember to ask them during your visits  Activity:  Rest as much as possible  Try to keep all activities short  You may be able to do some exercise soon after you have your baby  Talk with your primary healthcare provider before you start exercising  If you work outside the home, ask when you can return to your job  Kegel exercises:  Kegel exercises may help your vaginal and rectal muscles heal faster  You can do Kegel exercises by tightening and relaxing the muscles around your vagina  Kegel exercises help make the muscles stronger  Breast care:  When your milk comes in, your breasts may feel full and hard  Ask how to care for your breasts, even if you are not breastfeeding  Constipation:  Do not try to push the bowel movement out if it is too hard   High-fiber foods, extra liquids, and regular exercise can help you prevent constipation  Examples of high-fiber foods are fruit and bran  Prune juice and water are good liquids to drink  Regular exercise helps your digestive system work  You may also be told to take over-the-counter fiber and stool softener medicines  Take these items as directed  Hemorrhoids:  Pregnancy can cause severe hemorrhoids  You may have rectal pain because of the hemorrhoids  Ask how to prevent or treat hemorrhoids  Perineum care: Your perineum is the area between your vagina and anus  Keep the area clean and dry to help it heal and to prevent infection  Wash the area gently with soap and water when you bathe or shower  Rinse your perineum with warm water when you use the toilet  Your primary healthcare provider may suggest you use a warm sitz bath to help decrease pain  A sitz bath is a bathtub or basin filled to hip level  Stay in the sitz bath for 20 to 30 minutes, or as directed  Vaginal discharge: You will have vaginal discharge, called lochia, after your delivery  The lochia is bright red the first day or two after the birth  By the fourth day, the amount decreases, and it turns red-brown  Use a sanitary pad rather than a tampon to prevent a vaginal infection  It is normal to have lochia up to 8 weeks after your baby is born  Monthly periods: Your period may start again within 7 to 12 weeks after your baby is born  If you are breastfeeding, it may take longer for your period to start again  You can still get pregnant again even though you do not have your monthly period  Talk with your primary healthcare provider about a birth control method that will be good for you if you do not want to get pregnant  Mood changes: Many new mothers have some kind of mood changes after delivery  Some of these changes occur because of lack of sleep, hormone changes, and caring for a new baby  Some mood changes can be more serious, such as postpartum depression   Talk with your primary healthcare provider if you feel unable to care for yourself or your baby  Sexual activity:  You may need to avoid sex for 6 to 7 weeks after you have your baby  You may notice you have a decreased desire for sex, or sex may be painful  You may need to use a vaginal lubricant (gel) to help make sex more comfortable  Contact your primary healthcare provider if:   You have heavy vaginal bleeding that fills 1 or more sanitary pads in 1 hour  You have a fever  Your pain does not go away, or gets worse  The skin between your vagina and rectum is swollen, warm, or red  You have swollen, hard, or painful breasts  You feel very sad or depressed  You feel more tired than usual      You have questions or concerns about your condition or care  Seek care immediately or call 911 if:   You have pus or yellow drainage coming from your vagina or wound  You are urinating very little, or not at all  Your arm or leg feels warm, tender, and painful  It may look swollen and red  You feel lightheaded, have sudden and worsening chest pain, or trouble breathing  You may have more pain when you take deep breaths or cough, or you may cough up blood  © 2014 [address] is for End User's use only and may not be sold, redistributed or otherwise used for commercial purposes  All illustrations and images included in CareNotes® are the copyrighted property of [employer] , [employer]  or Leonel Fleming  The above information is an  only  It is not intended as medical advice for individual conditions or treatments  Talk to your doctor, nurse or pharmacist before following any medical regimen to see if it is safe and effective for you

## 2023-03-20 NOTE — PLAN OF CARE
Problem: PAIN - ADULT  Goal: Verbalizes/displays adequate comfort level or baseline comfort level  Description: Interventions:  - Encourage patient to monitor pain and request assistance  - Assess pain using appropriate pain scale  - Administer analgesics based on type and severity of pain and evaluate response  - Implement non-pharmacological measures as appropriate and evaluate response  - Consider cultural and social influences on pain and pain management  - Notify physician/advanced practitioner if interventions unsuccessful or patient reports new pain  Outcome: Progressing     Problem: INFECTION - ADULT  Goal: Absence or prevention of progression during hospitalization  Description: INTERVENTIONS:  - Assess and monitor for signs and symptoms of infection  - Monitor lab/diagnostic results  - Monitor all insertion sites, i e  indwelling lines, tubes, and drains  - Monitor endotracheal if appropriate and nasal secretions for changes in amount and color  - [place] appropriate cooling/warming therapies per order  - Administer medications as ordered  - Instruct and encourage patient and family to use good hand hygiene technique  - Identify and instruct in appropriate isolation precautions for identified infection/condition  Outcome: Progressing  Goal: Absence of fever/infection during neutropenic period  Description: INTERVENTIONS:  - Monitor WBC    Outcome: Progressing     Problem: SAFETY ADULT  Goal: Patient will remain free of falls  Description: INTERVENTIONS:  - Educate patient/family on patient safety including physical limitations  - Instruct patient to call for assistance with activity   - Consult OT/PT to assist with strengthening/mobility   - Keep Call bell within reach  - Keep bed low and locked with side rails adjusted as appropriate  - Keep care items and personal belongings within reach  - Initiate and maintain comfort rounds  - Make Fall Risk Sign visible to staff  - Offer Toileting every 2 Hours, in advance of need  - Initiate/Maintain bed alarm if needed  - Obtain necessary fall risk management equipment: no-slip socks provided  - Apply yellow socks and bracelet for high fall risk patients  - Consider moving patient to room near nurses station  Outcome: Progressing  Goal: Maintain or return to baseline ADL function  Description: INTERVENTIONS:  -  Assess patient's ability to carry out ADLs; assess patient's baseline for ADL function and identify physical deficits which impact ability to perform ADLs (bathing, care of mouth/teeth, toileting, grooming, dressing, etc )  - Assess/evaluate cause of self-care deficits   - Assess range of motion  - Assess patient's mobility; develop plan if impaired  - Assess patient's need for assistive devices and provide as appropriate  - Encourage maximum independence but intervene and supervise when necessary  - Involve family in performance of ADLs  - Assess for home care needs following discharge   - Consider OT consult to assist with ADL evaluation and planning for discharge  - Provide patient education as appropriate  Outcome: Progressing  Goal: Maintains/Returns to pre admission functional level  Description: INTERVENTIONS:  - Perform BMAT or MOVE assessment daily    - Set and communicate daily mobility goal to care team and patient/family/caregiver  - Collaborate with rehabilitation services on mobility goals if consulted  - Perform Range of Motion prn   - Reposition patient prn   - Dangle patient prn   - Stand patient prn   - Ambulate patient prn   - Out of bed to chair prn    - Out of bed for meals prn   - Out of bed for toileting  - Record patient progress and toleration of activity level   Outcome: Progressing     Problem: Knowledge Deficit  Goal: Patient/family/caregiver demonstrates understanding of disease process, treatment plan, medications, and discharge instructions  Description: Complete learning assessment and assess knowledge base    Interventions:  - Provide teaching at level of understanding  - Provide teaching via preferred learning methods  Outcome: Progressing  Goal: Verbalizes understanding of labor plan  Description: Assess patient/family/caregiver's baseline knowledge level and ability to understand information  Provide education via patient/family/caregiver's preferred learning method at appropriate level of understanding  1  Provide teaching at level of understanding  2  Provide teaching via preferred learning method(s)    Outcome: Progressing     Problem: DISCHARGE PLANNING  Goal: Discharge to home or other facility with appropriate resources  Description: INTERVENTIONS:  - Identify barriers to discharge w/patient and caregiver  - Arrange for needed discharge resources and transportation as appropriate  - Identify discharge learning needs (meds, wound care, etc )  - Arrange for interpretive services to assist at discharge as needed  - Refer to Case Management Department for coordinating discharge planning if the patient needs post-hospital services based on physician/advanced practitioner order or complex needs related to functional status, cognitive ability, or social support system  Outcome: Progressing     Problem: BIRTH - VAGINAL/ SECTION  Goal: Fetal and maternal status remain reassuring during the birth process  Description: INTERVENTIONS:  - Monitor vital signs  - Monitor fetal heart rate  - Monitor uterine activity  - Monitor labor progression (vaginal delivery)  - DVT prophylaxis  - Antibiotic prophylaxis  Outcome: Progressing  Goal: Emotionally satisfying birthing experience for mother/fetus  Description: Interventions:  - Assess, plan, implement and evaluate the nursing care given to the patient in labor  - Advocate the philosophy that each childbirth experience is a unique experience and support the family's chosen level of involvement and control during the labor process   - Actively participate in both the patient's and family's teaching of the birth process  - Consider cultural, [place] and age-specific factors and plan care for the patient in labor  Outcome: Progressing     Problem: Labor & Delivery  Goal: Manages discomfort  Description: Assess and monitor for signs and symptoms of discomfort  Assess patient's pain level regularly and per hospital policy  Administer medications as ordered  Support use of nonpharmacological methods to help control pain such as distraction, imagery, relaxation, and application of heat and cold  Collaborate with interdisciplinary team and patient to determine appropriate pain management plan  1  Include patient in decisions related to comfort  2  Offer non-pharmacological pain management interventions  3  Report ineffective pain management to physician  Outcome: Progressing  Goal: Patient vital signs are stable  Description: 1  Assess vital signs - vaginal delivery    Outcome: Progressing

## 2023-03-20 NOTE — L&D DELIVERY NOTE
OBGYN Vaginal Delivery Summary  Elana Lomas 33 y o  female MRN: [id number]  Unit/Bed#: -01 Encounter: [id number]    Predelivery Diagnosis:  1  Pregnancy at 39w  2  A1GDM  3  Anxiety   4  Cyclic vomiting syndrome  5  Proteinuria in pregnancy  6  COVID in pregnancy  7  Rh negative    Postdelivery Diagnosis:  1  Same as above  2  Delivery of term     Procedure: spontaneous vaginal delivery, repair of 1st degree laceration(s)    Attending: Dr Loni Ochoa    Assistant: Dr Lula Chou    Anesthesia: Epidural    EBL: 100mL  Admission H 9 g/dL  Admission platelets: 589 thousands/uL    Complications: none apparent    Specimens: cord blood, arterial and venous cord blood gases, placenta to storage    Findings:   1  Viable female at 1050, with APGARS of 8 and 9 at 1 and 5 minutes respectively  Weight pending at time of dictation  2  Spontaneous delivery of intact placenta at 1054  central insertion 3 vessel umbilical cord  3  1st degree laceration repaired with 3-0 vicryl rapide  4  Blood gases:  Umbilical Cord Venous Blood Gas:  Results from last 7 days   Lab Units 23  1057   PH COV  7 446   PCO2 COV mm HG 30 7   HCO3 COV mmol/L 20 7   BASE EXC COV mmol/L -2 0*   O2 CT CD VB mL/dL 20 0   O2 HGB, VENOUS CORD % 02 8     Umbilical Cord Arterial Blood Gas:  Results from last 7 days   Lab Units 23  1057   PH COA  7 444*   PCO2 COA  28 2*   PO2 COA mm HG 31 7*   HCO3 COA mmol/L 18 9   BASE EXC COA mmol/L -3 4*   O2 CONTENT CORD ART ml/dl 19 0   O2 HGB, ARTERIAL CORD % 78 1       Disposition:  Patient tolerated the procedure well and was recovering in labor and delivery room  Brief history and labor course:  Elana Lomas is a 33 y o  [id number] at 39wk1d  She presented to labor and delivery in labor  At presentation she was 5/80/-3  She received an epidural for analgesia  She progressed to complete cervical dilation and began pushing       Description of procedure  After pushing for 6 minutes, the patient delivered a viable female  at 1050 on 3/20/23, weight pending at time of dictation, apgars of 8 (1 min) and 9 (5 min)  The fetal vertex delivered spontaneously  Baby restituted  to direct OA  There was a nuchal cord wrapped around the neck and trunk which was delivered through and then reduced  The anterior (left) shoulder delivered atraumatically with maternal expulsive forces and the assistance of gentle downward traction  The posterior shoulder delivered with maternal expulsive forces and the assistance of gentle upward traction  The remainder of the fetus delivered spontaneously  Upon delivery the infant was placed on the mother's abdomen and delayed cord clamping was performed  The umbilical cord was then doubly clamped and cut  The infant was noted to cry spontaneously and was moving all extremities appropriately  There was no evidence for injury  Awaiting nurse resuscitators evaluated the   Arterial and venous cord blood gases and cord blood were collected for analysis and were promptly sent to the lab  In the immediate post-partum, IV pitocin was administered, and the uterus was noted to contract down well with massage and pitocin  The placenta delivered spontaneously at 1054 and was noted to be intact and had a centrally inserted 3 vessel cord  The placenta was sent to storage  The vagina, cervix, perineum, and rectum were inspected  A 1st degree laceration(s) were noted  Repair was completed with 3-0 vicryl rapide  At the conclusion of the procedure, all needle, sponge, and instrument counts were noted to be correct  Patient tolerated the procedure well and was allowed to recover in labor and delivery room with family and  before being transferred to the post-partum floor  Due to the acuity of the floor, Dr Sara Delacruz was in the OR and unavailable  Dr Oswaldo Freeman was present and participated in all key portions of the case      Yani Tavares MD  3/20/2023  11:37 AM

## 2023-03-20 NOTE — PLAN OF CARE
Problem: BIRTH - VAGINAL/ SECTION  Goal: Fetal and maternal status remain reassuring during the birth process  Description: INTERVENTIONS:  - Monitor vital signs  - Monitor fetal heart rate  - Monitor uterine activity  - Monitor labor progression (vaginal delivery)  - DVT prophylaxis  - Antibiotic prophylaxis  3/20/2023 1311 by Torrey Lemos RN  Outcome: Completed  3/20/2023 0742 by Torrey Lemos RN  Outcome: Progressing  Goal: Emotionally satisfying birthing experience for mother/fetus  Description: Interventions:  - Assess, plan, implement and evaluate the nursing care given to the patient in labor  - Advocate the philosophy that each childbirth experience is a unique experience and support the family's chosen level of involvement and control during the labor process   - Actively participate in both the patient's and family's teaching of the birth process  - Consider cultural, Caodaism and age-specific factors and plan care for the patient in labor  3/20/2023 1311 by Torrey Lemos RN  Outcome: Completed  3/20/2023 0742 by Torrey Lemos RN  Outcome: Progressing     Problem: Labor & Delivery  Goal: Manages discomfort  Description: Assess and monitor for signs and symptoms of discomfort  Assess patient's pain level regularly and per hospital policy  Administer medications as ordered  Support use of nonpharmacological methods to help control pain such as distraction, imagery, relaxation, and application of heat and cold  Collaborate with interdisciplinary team and patient to determine appropriate pain management plan  1  Include patient in decisions related to comfort  2  Offer non-pharmacological pain management interventions  3  Report ineffective pain management to physician   3/20/2023 1311 by Torrey Lemos RN  Outcome: Completed  3/20/2023 0742 by Torrey Lemos RN  Outcome: Progressing  Goal: Patient vital signs are stable  Description: 1   Assess vital signs - vaginal delivery    3/20/2023 1311 by Junie Matthew RN  Outcome: Completed  3/20/2023 0742 by Junie Matthew RN  Outcome: Progressing

## 2023-03-20 NOTE — ANESTHESIA PREPROCEDURE EVALUATION
Procedure:  LABOR ANALGESIA    Relevant Problems   GYN   (+) 39 weeks gestation of pregnancy      NEURO/PSYCH   (+) Anxiety        Physical Exam    Airway    Mallampati score: I  TM Distance: >3 FB  Neck ROM: full     Dental   No notable dental hx     Cardiovascular      Pulmonary      Other Findings        Anesthesia Plan  ASA Score- 2     Anesthesia Type- epidural with ASA Monitors  Additional Monitors:   Airway Plan:           Plan Factors-Exercise tolerance (METS): >4 METS  Chart reviewed  Existing labs reviewed  Patient summary reviewed  Patient is not a current smoker  There is medical exclusion for perioperative obstructive sleep apnea risk education  Induction-     Postoperative Plan-     Informed Consent- Anesthetic plan and risks discussed with patient  I personally reviewed this patient with the CRNA  Discussed and agreed on the Anesthesia Plan with the CRNA  Rosario Vallecillo

## 2023-03-21 VITALS
WEIGHT: 201 LBS | SYSTOLIC BLOOD PRESSURE: 99 MMHG | HEART RATE: 85 BPM | RESPIRATION RATE: 17 BRPM | OXYGEN SATURATION: 97 % | BODY MASS INDEX: 34.31 KG/M2 | TEMPERATURE: 97.8 F | HEIGHT: 64 IN | DIASTOLIC BLOOD PRESSURE: 69 MMHG

## 2023-03-21 LAB
ABO GROUP BLD: NORMAL
BLD GP AB SCN SERPL QL: NEGATIVE
FETAL CELL SCN BLD QL ROSETTE: NEGATIVE
RH BLD: NEGATIVE

## 2023-03-21 RX ORDER — IBUPROFEN 600 MG/1
600 TABLET ORAL EVERY 6 HOURS
Qty: 30 TABLET | Refills: 0
Start: 2023-03-21

## 2023-03-21 RX ORDER — DIAPER,BRIEF,INFANT-TODD,DISP
1 EACH MISCELLANEOUS DAILY PRN
Qty: 30 G | Refills: 0
Start: 2023-03-21

## 2023-03-21 RX ADMIN — IBUPROFEN 600 MG: 600 TABLET, FILM COATED ORAL at 05:08

## 2023-03-21 RX ADMIN — DOCUSATE SODIUM 100 MG: 100 CAPSULE, LIQUID FILLED ORAL at 08:55

## 2023-03-21 RX ADMIN — HUMAN RHO(D) IMMUNE GLOBULIN 300 MCG: 300 INJECTION, SOLUTION INTRAMUSCULAR at 12:24

## 2023-03-21 NOTE — PROGRESS NOTES
Progress Note - OB/GYN  Ariella Yarbrough 33 y o  female MRN: [id number]  Unit/Bed#: -01 Encounter: [id number]    Assessment and Plan     Ariella Yarbrough is a patient of: [facility]  She is PPD# 1 s/p  spontaneous vaginal delivery  Recovering well and is stable       Rh negative state in antepartum period  Assessment & Plan  Rhogam panel postpartum    Gestational diabetes mellitus (GDM) in third trimester  Assessment & Plan  No results found for: HGBA1C    Recent Labs     23  0657   POCGLU 79       Blood Sugar Average: Last 72 hrs:        *  (spontaneous vaginal delivery)  Assessment & Plan  Admit   T&S, CBC, RPR  CLD  IV fluids  GBS prophylaxis is not needed  Expectant management  Epidural per patient request          Disposition    - Anticipate discharge home on PPD# 1 or 2 pending post-partum Rhogam panel and baby's labs  Subjective/Objective     Chief Complaint: Postpartum State     Subjective:    Ariella Yarbrough is PPD#1 s/p  spontaneous vaginal delivery  She has no current complaints  Pain is well controlled  Patient is currently voiding  She is ambulating  Patient is currently passing flatus and has had no bowel movement  She is tolerating PO, and denies nausea or vomitting  Patient denies fever, chills, chest pain, shortness of breath, or calf tenderness  Lochia is normal  She is Breastfeeding  She is recovering well and is stable         Vitals:   /73   Pulse 76   Temp 97 5 °F (36 4 °C) (Oral)   Resp 20   Ht 5' 4" (1 626 m)   Wt 91 2 kg (201 lb)   LMP 2022   SpO2 98%   Breastfeeding Yes   BMI 34 50 kg/m²       Intake/Output Summary (Last 24 hours) at 3/21/2023 8636  Last data filed at 3/20/2023 1746  Gross per 24 hour   Intake 2059 42 ml   Output 1650 ml   Net 409 42 ml       Invasive Devices     Peripheral Intravenous Line  Duration           Peripheral IV 23 Left;Ventral (anterior) Wrist <1 day                Physical Exam:   GEN: Marlon Boss appears well, alert and oriented x 3, pleasant and cooperative   CARDIO: RRR, no murmurs or rubs  RESP:  CTAB, no wheezes or rales  ABDOMEN: soft, no tenderness, no distention  EXTREMITIES: SCDs on, non tender, no erythema, b/l Maxi's sign negative      Labs:     Hemoglobin   Date Value Ref Range Status   03/20/2023 11 9 11 5 - 15 4 g/dL Final   01/04/2023 11 2 (L) 11 5 - 15 4 g/dL Final     WBC   Date Value Ref Range Status   03/20/2023 12 05 (H) 4 31 - 10 16 Thousand/uL Final   01/04/2023 14 06 (H) 4 31 - 10 16 Thousand/uL Final     Platelets   Date Value Ref Range Status   03/20/2023 198 149 - 390 Thousands/uL Final   01/04/2023 252 149 - 390 Thousands/uL Final     Creatinine   Date Value Ref Range Status   04/23/2016 0 61 0 60 - 1 30 mg/dL Final     Comment:     Standardized to IDMS reference method   03/11/2016 0 66 0 60 - 1 30 mg/dL Final     Comment:     Standardized to IDMS reference method     AST   Date Value Ref Range Status   04/23/2016 12 5 - 45 U/L Final     ALT   Date Value Ref Range Status   04/23/2016 31 12 - 78 U/L Final          Patricia Trivedi MD  3/21/2023  6:20 AM

## 2023-03-21 NOTE — PLAN OF CARE
Problem: PAIN - ADULT  Goal: Verbalizes/displays adequate comfort level or baseline comfort level  Description: Interventions:  - Encourage patient to monitor pain and request assistance  - Assess pain using appropriate pain scale  - Administer analgesics based on type and severity of pain and evaluate response  - Implement non-pharmacological measures as appropriate and evaluate response  - Consider cultural and social influences on pain and pain management  - Notify physician/advanced practitioner if interventions unsuccessful or patient reports new pain  Outcome: Progressing     Problem: INFECTION - ADULT  Goal: Absence or prevention of progression during hospitalization  Description: INTERVENTIONS:  - Assess and monitor for signs and symptoms of infection  - Monitor lab/diagnostic results  - Monitor all insertion sites, i e  indwelling lines, tubes, and drains  - Monitor endotracheal if appropriate and nasal secretions for changes in amount and color  - Advance appropriate cooling/warming therapies per order  - Administer medications as ordered  - Instruct and encourage patient and family to use good hand hygiene technique  - Identify and instruct in appropriate isolation precautions for identified infection/condition  Outcome: Progressing  Goal: Absence of fever/infection during neutropenic period  Description: INTERVENTIONS:  - Monitor WBC    Outcome: Progressing     Problem: SAFETY ADULT  Goal: Patient will remain free of falls  Description: INTERVENTIONS:  - Educate patient/family on patient safety including physical limitations  - Instruct patient to call for assistance with activity   - Consult OT/PT to assist with strengthening/mobility   - Keep Call bell within reach  - Keep bed low and locked with side rails adjusted as appropriate  - Keep care items and personal belongings within reach  - Initiate and maintain comfort rounds  - Make Fall Risk Sign visible to staff  - Apply yellow socks and bracelet for high fall risk patients  - Consider moving patient to room near nurses station  Outcome: Progressing  Goal: Maintain or return to baseline ADL function  Description: INTERVENTIONS:  -  Assess patient's ability to carry out ADLs; assess patient's baseline for ADL function and identify physical deficits which impact ability to perform ADLs (bathing, care of mouth/teeth, toileting, grooming, dressing, etc )  - Assess/evaluate cause of self-care deficits   - Assess range of motion  - Assess patient's mobility; develop plan if impaired  - Assess patient's need for assistive devices and provide as appropriate  - Encourage maximum independence but intervene and supervise when necessary  - Involve family in performance of ADLs  - Assess for home care needs following discharge   - Consider OT consult to assist with ADL evaluation and planning for discharge  - Provide patient education as appropriate  Outcome: Progressing  Goal: Maintains/Returns to pre admission functional level  Description: INTERVENTIONS:  - Perform BMAT or MOVE assessment daily    - Set and communicate daily mobility goal to care team and patient/family/caregiver  - Collaborate with rehabilitation services on mobility goals if consulted  - Out of bed for toileting  - Record patient progress and toleration of activity level   Outcome: Progressing     Problem: Knowledge Deficit  Goal: Patient/family/caregiver demonstrates understanding of disease process, treatment plan, medications, and discharge instructions  Description: Complete learning assessment and assess knowledge base  Interventions:  - Provide teaching at level of understanding  - Provide teaching via preferred learning methods  Outcome: Progressing  Goal: Verbalizes understanding of labor plan  Description: Assess patient/family/caregiver's baseline knowledge level and ability to understand information    Provide education via patient/family/caregiver's preferred learning method at appropriate level of understanding  1  Provide teaching at level of understanding  2  Provide teaching via preferred learning method(s)    Outcome: Progressing     Problem: DISCHARGE PLANNING  Goal: Discharge to home or other facility with appropriate resources  Description: INTERVENTIONS:  - Identify barriers to discharge w/patient and caregiver  - Arrange for needed discharge resources and transportation as appropriate  - Identify discharge learning needs (meds, wound care, etc )  - Arrange for interpretive services to assist at discharge as needed  - Refer to Case Management Department for coordinating discharge planning if the patient needs post-hospital services based on physician/advanced practitioner order or complex needs related to functional status, cognitive ability, or social support system  Outcome: Progressing     Problem: POSTPARTUM  Goal: Experiences normal postpartum course  Description: INTERVENTIONS:  - Monitor maternal vital signs  - Assess uterine involution and lochia  Outcome: Progressing  Goal: Appropriate maternal -  bonding  Description: INTERVENTIONS:  - Identify family support  - Assess for appropriate maternal/infant bonding   -Encourage maternal/infant bonding opportunities  - Referral to  or  as needed  Outcome: Progressing  Goal: Establishment of infant feeding pattern  Description: INTERVENTIONS:  - Assess breast/bottle feeding  - Refer to lactation as needed  Outcome: Progressing  Goal: Incision(s), wounds(s) or drain site(s) healing without S/S of infection  Description: INTERVENTIONS  - Assess and document dressing, incision, wound bed, drain sites and surrounding tissue  - Provide patient and family education  Outcome: Progressing     Problem: ALTERATION IN THE BREAST  Goal: Optimize infant feeding at the breast  Description: INTERVENTIONS:  - Latch, breast and nipple assessment  - Assess prior breast feeding history  - Hand expression of breast milk  - For cracked, bleeding and or sore nipples reassess latch, treat damaged nipple  -Educate mother on feeding cues  -Positioning/latch techniques  Outcome: Progressing     Problem: INADEQUATE LATCH, SUCK OR SWALLOW  Goal: Demonstrate ability to latch and sustain latch, audible swallowing and satiety  Description: INTERVENTIONS:  - Assess oral anatomy, notify Physician/AP for abnormal findings  - Establish milk expression  - Maximize feeding opportunity (skin to skin, behavioral state)  - Position/latch techniques  - Discourage use of pacifier-artificial nipple  - Mechanical pumping  - Nipple Shield  - Supplemental formula feeding (Physician/AP order)  - Alternative feeding method  Outcome: Progressing

## 2023-03-21 NOTE — PLAN OF CARE
Problem: PAIN - ADULT  Goal: Verbalizes/displays adequate comfort level or baseline comfort level  Description: Interventions:  - Encourage patient to monitor pain and request assistance  - Assess pain using appropriate pain scale  - Administer analgesics based on type and severity of pain and evaluate response  - Implement non-pharmacological measures as appropriate and evaluate response  - Consider cultural and social influences on pain and pain management  - Notify physician/advanced practitioner if interventions unsuccessful or patient reports new pain  3/21/2023 1551 by Manuela Estrada  Outcome: Completed  3/21/2023 0943 by Manuela Estrada  Outcome: Progressing     Problem: INFECTION - ADULT  Goal: Absence or prevention of progression during hospitalization  Description: INTERVENTIONS:  - Assess and monitor for signs and symptoms of infection  - Monitor lab/diagnostic results  - Monitor all insertion sites, i e  indwelling lines, tubes, and drains  - Monitor endotracheal if appropriate and nasal secretions for changes in amount and color  - [place] appropriate cooling/warming therapies per order  - Administer medications as ordered  - Instruct and encourage patient and family to use good hand hygiene technique  - Identify and instruct in appropriate isolation precautions for identified infection/condition  3/21/2023 1551 by Manuela Estrada  Outcome: Completed  3/21/2023 0943 by Manuela Estrada  Outcome: Progressing  Goal: Absence of fever/infection during neutropenic period  Description: INTERVENTIONS:  - Monitor WBC    3/21/2023 1551 by Manuela Estrada  Outcome: Completed  3/21/2023 0943 by Manuela Estrada  Outcome: Progressing     Problem: SAFETY ADULT  Goal: Patient will remain free of falls  Description: INTERVENTIONS:  - Educate patient/family on patient safety including physical limitations  - Instruct patient to call for assistance with activity   - Consult OT/PT to assist with strengthening/mobility   - Keep Call bell within reach  - Keep bed low and locked with side rails adjusted as appropriate  - Keep care items and personal belongings within reach  - Initiate and maintain comfort rounds  - Make Fall Risk Sign visible to staff  - Apply yellow socks and bracelet for high fall risk patients  - Consider moving patient to room near nurses station  3/21/2023 1551 by Debora Alfaro  Outcome: Completed  3/21/2023 0943 by Dbeora Alfaro  Outcome: Progressing  Goal: Maintain or return to baseline ADL function  Description: INTERVENTIONS:  -  Assess patient's ability to carry out ADLs; assess patient's baseline for ADL function and identify physical deficits which impact ability to perform ADLs (bathing, care of mouth/teeth, toileting, grooming, dressing, etc )  - Assess/evaluate cause of self-care deficits   - Assess range of motion  - Assess patient's mobility; develop plan if impaired  - Assess patient's need for assistive devices and provide as appropriate  - Encourage maximum independence but intervene and supervise when necessary  - Involve family in performance of ADLs  - Assess for home care needs following discharge   - Consider OT consult to assist with ADL evaluation and planning for discharge  - Provide patient education as appropriate  3/21/2023 1551 by Debora Alfaro  Outcome: Completed  3/21/2023 0943 by Debora Alfaro  Outcome: Progressing  Goal: Maintains/Returns to pre admission functional level  Description: INTERVENTIONS:  - Perform BMAT or MOVE assessment daily    - Set and communicate daily mobility goal to care team and patient/family/caregiver     - Collaborate with rehabilitation services on mobility goals if consulted  - Out of bed for toileting  - Record patient progress and toleration of activity level   3/21/2023 1551 by Debora Alfaro  Outcome: Completed  3/21/2023 0943 by Debora Alfaro  Outcome: Progressing     Problem: Knowledge Deficit  Goal: Patient/family/caregiver demonstrates understanding of disease process, treatment plan, medications, and discharge instructions  Description: Complete learning assessment and assess knowledge base  Interventions:  - Provide teaching at level of understanding  - Provide teaching via preferred learning methods  3/21/2023 1551 by Jimmy Chavis  Outcome: Completed  3/21/2023 0943 by Jimmy Chavis  Outcome: Progressing  Goal: Verbalizes understanding of labor plan  Description: Assess patient/family/caregiver's baseline knowledge level and ability to understand information  Provide education via patient/family/caregiver's preferred learning method at appropriate level of understanding  1  Provide teaching at level of understanding    2  Provide teaching via preferred learning method(s)   3/21/2023 1551 by Jimmy Chaivs  Outcome: Completed  3/21/2023 0943 by Jimmy Chavis  Outcome: Progressing     Problem: DISCHARGE PLANNING  Goal: Discharge to home or other facility with appropriate resources  Description: INTERVENTIONS:  - Identify barriers to discharge w/patient and caregiver  - Arrange for needed discharge resources and transportation as appropriate  - Identify discharge learning needs (meds, wound care, etc )  - Arrange for interpretive services to assist at discharge as needed  - Refer to Case Management Department for coordinating discharge planning if the patient needs post-hospital services based on physician/advanced practitioner order or complex needs related to functional status, cognitive ability, or social support system  3/21/2023 1551 by Jimmy Chavis  Outcome: Completed  3/21/2023 0943 by Jimmy Chavis  Outcome: Progressing     Problem: POSTPARTUM  Goal: Experiences normal postpartum course  Description: INTERVENTIONS:  - Monitor maternal vital signs  - Assess uterine involution and lochia  3/21/2023 1551 by Jimmy Chavis  Outcome: Completed  3/21/2023 0943 by Harper Arreola Michael  Outcome: Progressing  Goal: Appropriate maternal -  bonding  Description: INTERVENTIONS:  - Identify family support  - Assess for appropriate maternal/infant bonding   -Encourage maternal/infant bonding opportunities  - Referral to  or  as needed  3/21/2023 1551 by Margie Walls  Outcome: Completed  3/21/2023 0943 by Margie Walls  Outcome: Progressing  Goal: Establishment of infant feeding pattern  Description: INTERVENTIONS:  - Assess breast/bottle feeding  - Refer to lactation as needed  3/21/2023 1551 by Margie Walls  Outcome: Completed  3/21/2023 0943 by Margie Walls  Outcome: Progressing  Goal: Incision(s), wounds(s) or drain site(s) healing without S/S of infection  Description: INTERVENTIONS  - Assess and document dressing, incision, wound bed, drain sites and surrounding tissue  - Provide patient and family education  3/21/2023 1551 by Margie Walls  Outcome: Completed  3/21/2023 0943 by Margie Walls  Outcome: Progressing     Problem: ALTERATION IN THE BREAST  Goal: Optimize infant feeding at the breast  Description: INTERVENTIONS:  - Latch, breast and nipple assessment  - Assess prior breast feeding history  - Hand expression of breast milk  - For cracked, bleeding and or sore nipples reassess latch, treat damaged nipple  -Educate mother on feeding cues  -Positioning/latch techniques  3/21/2023 1551 by Margie Walls  Outcome: Completed  3/21/2023 0943 by Margie Walls  Outcome: Progressing     Problem: INADEQUATE LATCH, SUCK OR SWALLOW  Goal: Demonstrate ability to latch and sustain latch, audible swallowing and satiety  Description: INTERVENTIONS:  - Assess oral anatomy, notify Physician/AP for abnormal findings  - Establish milk expression  - Maximize feeding opportunity (skin to skin, behavioral state)  - Position/latch techniques  - Discourage use of pacifier-artificial nipple  - Mechanical pumping  - Nipple Shield  - Supplemental formula feeding (Physician/AP order)  - Alternative feeding method  3/21/2023 1551 by [place] Friend  Outcome: Completed  3/21/2023 0943 by [place] Friend  Outcome: Progressing

## 2023-03-21 NOTE — DISCHARGE SUMMARY
Discharge Summary - Efraín Hernandez 33 y o  female MRN: [id number]    Unit/Bed#: -01 Encounter: [id number]    Admission Date: 3/20/2023     Discharge Date: 3/21/2023    Patient Active Problem List   Diagnosis   • Anxiety   •  (spontaneous vaginal delivery)   • Cyclic vomiting syndrome   • History of forceps delivery in prior pregnancy, currently pregnant   • Proteinuria affecting pregnancy in second trimester   • COVID-19 affecting pregnancy in second trimester   • Gestational diabetes mellitus (GDM) in third trimester   • Rh negative state in antepartum period         OBGYN Practice: [facility] Course:     Efraín Hernandez is a 33 y o   who was admitted at 36w3d for labor  On initial cervical exam she was found to be 5/80/-3  She may change to 6 5/ 80/-3 and was admitted and managed expectantly  An epidural placed for analgesia  Progressed to complete cervical dilation and had artificial rupture of membranes  She delivered a viable female  on 3/21/2023 at 1054  Weight 5 lbs 13 3 oz via spontaneous vaginal delivery  Apgars were 8 (1 min) and 9 (5 min)   was transferred to  nursery  Patient tolerated the procedure well and was transferred to recovery in stable condition  Her post-partum course was uncomplicated  Her post-partum pain was well controlled with oral analgesics  On day of discharge, she was ambulating and able to reasonably perform all ADLs  She was voiding and had appropriate bowel function  Pain was well controlled  She was discharged home on postpartum day #1 without complications  Patient was instructed to follow up with her OB as an outpatient and was given appropriate warnings to call doctor or provider if she develops signs of infection or uncontrolled pain  On day of discharge she was ambulating, voiding spontaneously, tolerating oral intake and hemodynamically stable   Mom's blood type is O negative and  Rhogam was given     Disposition: Home    Planned Readmission: No    Discharge Medications:   Please see AVS    Discharge instructions :   -Do not place anything (no partner, tampons or douche) in your vagina for 6 weeks  -You may walk for exercise for the first 6 weeks then gradually return to your usual activities    -Please do not drive for 1 week if you have no stitches and for 2 weeks if you have stitches or underwent a  delivery     -You may take baths or shower per your preference    -Please look at your bust (breasts) in the mirror daily and call your doctor for redness or tenderness or increased warmth  - If you have had a  section please look at your incision daily as well and call provider for increasing redness or steady drainage from the incision    -Please call your doctor's office if temperature > 100 4*F or 38* C, worsening pain or a foul discharge      Follow Up:  - Follow up in 3 weeks for postpartum visit

## 2023-03-21 NOTE — PLAN OF CARE
Problem: PAIN - ADULT  Goal: Verbalizes/displays adequate comfort level or baseline comfort level  Description: Interventions:  - Encourage patient to monitor pain and request assistance  - Assess pain using appropriate pain scale  - Administer analgesics based on type and severity of pain and evaluate response  - Implement non-pharmacological measures as appropriate and evaluate response  - Consider cultural and social influences on pain and pain management  - Notify physician/advanced practitioner if interventions unsuccessful or patient reports new pain  Outcome: Progressing     Problem: INFECTION - ADULT  Goal: Absence or prevention of progression during hospitalization  Description: INTERVENTIONS:  - Assess and monitor for signs and symptoms of infection  - Monitor lab/diagnostic results  - Monitor all insertion sites, i e  indwelling lines, tubes, and drains  - Monitor endotracheal if appropriate and nasal secretions for changes in amount and color  - [place] appropriate cooling/warming therapies per order  - Administer medications as ordered  - Instruct and encourage patient and family to use good hand hygiene technique  - Identify and instruct in appropriate isolation precautions for identified infection/condition  Outcome: Progressing  Goal: Absence of fever/infection during neutropenic period  Description: INTERVENTIONS:  - Monitor WBC    Outcome: Progressing     Problem: SAFETY ADULT  Goal: Patient will remain free of falls  Description: INTERVENTIONS:  - Educate patient/family on patient safety including physical limitations  - Instruct patient to call for assistance with activity   - Consult OT/PT to assist with strengthening/mobility   - Keep Call bell within reach  - Keep bed low and locked with side rails adjusted as appropriate  - Keep care items and personal belongings within reach  - Initiate and maintain comfort rounds  - Make Fall Risk Sign visible to staff  - Apply yellow socks and bracelet for high fall risk patients  - Consider moving patient to room near nurses station  Outcome: Progressing  Goal: Maintain or return to baseline ADL function  Description: INTERVENTIONS:  -  Assess patient's ability to carry out ADLs; assess patient's baseline for ADL function and identify physical deficits which impact ability to perform ADLs (bathing, care of mouth/teeth, toileting, grooming, dressing, etc )  - Assess/evaluate cause of self-care deficits   - Assess range of motion  - Assess patient's mobility; develop plan if impaired  - Assess patient's need for assistive devices and provide as appropriate  - Encourage maximum independence but intervene and supervise when necessary  - Involve family in performance of ADLs  - Assess for home care needs following discharge   - Consider OT consult to assist with ADL evaluation and planning for discharge  - Provide patient education as appropriate  Outcome: Progressing  Goal: Maintains/Returns to pre admission functional level  Description: INTERVENTIONS:  - Perform BMAT or MOVE assessment daily    - Set and communicate daily mobility goal to care team and patient/family/caregiver  - Collaborate with rehabilitation services on mobility goals if consulted  - Out of bed for toileting  - Record patient progress and toleration of activity level   Outcome: Progressing     Problem: Knowledge Deficit  Goal: Patient/family/caregiver demonstrates understanding of disease process, treatment plan, medications, and discharge instructions  Description: Complete learning assessment and assess knowledge base  Interventions:  - Provide teaching at level of understanding  - Provide teaching via preferred learning methods  Outcome: Progressing  Goal: Verbalizes understanding of labor plan  Description: Assess patient/family/caregiver's baseline knowledge level and ability to understand information    Provide education via patient/family/caregiver's preferred learning method at appropriate level of understanding  1  Provide teaching at level of understanding  2  Provide teaching via preferred learning method(s)    Outcome: Progressing     Problem: DISCHARGE PLANNING  Goal: Discharge to home or other facility with appropriate resources  Description: INTERVENTIONS:  - Identify barriers to discharge w/patient and caregiver  - Arrange for needed discharge resources and transportation as appropriate  - Identify discharge learning needs (meds, wound care, etc )  - Arrange for interpretive services to assist at discharge as needed  - Refer to Case Management Department for coordinating discharge planning if the patient needs post-hospital services based on physician/advanced practitioner order or complex needs related to functional status, cognitive ability, or social support system  Outcome: Progressing     Problem: POSTPARTUM  Goal: Experiences normal postpartum course  Description: INTERVENTIONS:  - Monitor maternal vital signs  - Assess uterine involution and lochia  Outcome: Progressing  Goal: Appropriate maternal -  bonding  Description: INTERVENTIONS:  - Identify family support  - Assess for appropriate maternal/infant bonding   -Encourage maternal/infant bonding opportunities  - Referral to  or  as needed  Outcome: Progressing  Goal: Establishment of infant feeding pattern  Description: INTERVENTIONS:  - Assess breast/bottle feeding  - Refer to lactation as needed  Outcome: Progressing  Goal: Incision(s), wounds(s) or drain site(s) healing without S/S of infection  Description: INTERVENTIONS  - Assess and document dressing, incision, wound bed, drain sites and surrounding tissue  - Provide patient and family education  Outcome: Progressing     Problem: ALTERATION IN THE BREAST  Goal: Optimize infant feeding at the breast  Description: INTERVENTIONS:  - Latch, breast and nipple assessment  - Assess prior breast feeding history  - Hand expression of breast milk  - For cracked, bleeding and or sore nipples reassess latch, treat damaged nipple  -Educate mother on feeding cues  -Positioning/latch techniques  Outcome: Progressing     Problem: INADEQUATE LATCH, SUCK OR SWALLOW  Goal: Demonstrate ability to latch and sustain latch, audible swallowing and satiety  Description: INTERVENTIONS:  - Assess oral anatomy, notify Physician/AP for abnormal findings  - Establish milk expression  - Maximize feeding opportunity (skin to skin, behavioral state)  - Position/latch techniques  - Discourage use of pacifier-artificial nipple  - Mechanical pumping  - Nipple Shield  - Supplemental formula feeding (Physician/AP order)  - Alternative feeding method  Outcome: Progressing

## 2023-03-21 NOTE — LACTATION NOTE
This note was copied from a baby's chart  CONSULT - LACTATION  Baby Girl Ana Paula Lea Oniel 1 days female MRN: [id number]    [facility] NURSERY Room / Bed: (N)/(N) Encounter: [id number]    Maternal Information     MOTHER:  Jocelyn Denson  Maternal Age: 33 y o    OB History: # 1 - Date: 03/2015, Sex: None, Weight: None, GA: 5w6d, Delivery: None, Apgar1: None, Apgar5: None, Living: None, Birth Comments: None    # 2 - Date: 04/27/19, Sex: Female, Weight: 2920 g (6 lb 7 oz), GA: 39w5d, Delivery: Vaginal, Forceps, Apgar1: 8, Apgar5: 9, Living: Living, Birth Comments: None    # 3 - Date: 03/20/23, Sex: Female, Weight: 2645 g (5 lb 13 3 oz), GA: 39w1d, Delivery: Vaginal, Spontaneous, Apgar1: 8, Apgar5: 9, Living: Living, Birth Comments: None   Previouse breast reduction surgery? No    Lactation history:   Has patient previously breast fed: Yes   How long had patient previously breast fed: 2 years   Previous breast feeding complications:       Past Surgical History:   Procedure Laterality Date   • WISDOM TOOTH EXTRACTION Bilateral         Birth information:  YOB: 2024   Time of birth: 10:54 AM   Sex: female   Delivery type: Vaginal, Spontaneous   Birth Weight: 2645 g (5 lb 13 3 oz)   Percent of Weight Change: -2%     Gestational Age: 36w3d   [unfilled]      Feeding recommendations:  breast feed on demand     Met with mother  Provided mother with Ready, Set, Baby booklet  Discussed Skin to Skin contact an benefits to mom and baby  Talked about the delay of the first bath until baby has adjusted  Spoke about the benefits of rooming in  Feeding on cue and what that means for recognizing infant's hunger  Avoidance of pacifiers for the first month discussed  Talked about exclusive breastfeeding for the first 6 months  Positioning and latch reviewed as well as showing images of other feeding positions    Discussed the properties of a good latch in any position  Reviewed hand/manual expression  Discussed s/s that baby is getting enough milk and some s/s that breastfeeding dyad may need further help  Gave information on common concerns, what to expect the first few weeks after delivery, preparing for other caregivers, and how partners can help  Resources for support also provided  Information on hand expression given  Discussed benefits of knowing how to manually express breast including stimulating milk supply, softening nipple for latch and evacuating breast in the event of engorgement  Discussed 2nd night syndrome and ways to calm infant  Hand out given  Provided DC booklet at this time, enc family to review and prepare questions for day of DC  Met with mother to go over discharge breastfeeding booklet including the feeding log  Emphasized 8 or more (12) feedings in a 24 hour period, what to expect for the number of diapers per day of life and the progression of properties of the  stooling pattern  Reviewed breastfeeding and your lifestyle, storage and preparation of breast milk, how to keep you breast pump clean, the employed breastfeeding mother and paced bottle feeding handouts  Booklet included Breastfeeding Resources for after discharge including access to the number for the [id number]  Discussed this as the best resource to contact for questions or concerns regarding breasts,  feedings, and breastmilk  Discussed s/s engorgement and how to manage with medications, additional feedings at the breast or pumping sessions as needed, and cool compresses as well as s/s and management of mastitis and when to contact physician  Reviewed booklet and feeding log, addressed questions related to DC teaching   Enc family to continue to feed the baby on demand, look for signs of effective breastfeed like audible swallows, strong but comfortable tugging while latched, breasts softening (after milk comes in), baby falling asleep and releasing the breast, and meeting daily diaper goals  Mom has a breast pump at home  She nursed her first child for 2 years, no concerns currently about breastfeeding       Waldemar Reyes RN 3/21/2023 9:23 AM

## 2023-03-24 DIAGNOSIS — Z13.1 DIABETES MELLITUS SCREENING: ICD-10-CM

## 2023-03-24 DIAGNOSIS — Z86.32 HISTORY OF GESTATIONAL DIABETES: Primary | ICD-10-CM

## 2023-03-26 LAB — PLACENTA IN STORAGE: NORMAL

## 2023-07-25 ENCOUNTER — ANNUAL EXAM (OUTPATIENT)
Dept: OBGYN CLINIC | Facility: MEDICAL CENTER | Age: 28
End: 2023-07-25
Payer: COMMERCIAL

## 2023-07-25 VITALS
HEIGHT: 64 IN | BODY MASS INDEX: 31.24 KG/M2 | SYSTOLIC BLOOD PRESSURE: 104 MMHG | DIASTOLIC BLOOD PRESSURE: 70 MMHG | WEIGHT: 183 LBS

## 2023-07-25 DIAGNOSIS — Z12.4 SCREENING FOR CERVICAL CANCER: ICD-10-CM

## 2023-07-25 DIAGNOSIS — Z01.419 ROUTINE GYNECOLOGICAL EXAMINATION: Primary | ICD-10-CM

## 2023-07-25 PROBLEM — U07.1 COVID-19 AFFECTING PREGNANCY IN SECOND TRIMESTER: Status: RESOLVED | Noted: 2022-11-17 | Resolved: 2023-07-25

## 2023-07-25 PROBLEM — O98.512 COVID-19 AFFECTING PREGNANCY IN SECOND TRIMESTER: Status: RESOLVED | Noted: 2022-11-17 | Resolved: 2023-07-25

## 2023-07-25 PROBLEM — Z67.91 RH NEGATIVE STATE IN ANTEPARTUM PERIOD: Status: RESOLVED | Noted: 2023-02-28 | Resolved: 2023-07-25

## 2023-07-25 PROBLEM — O26.899 RH NEGATIVE STATE IN ANTEPARTUM PERIOD: Status: RESOLVED | Noted: 2023-02-28 | Resolved: 2023-07-25

## 2023-07-25 PROBLEM — O09.299 HISTORY OF FORCEPS DELIVERY IN PRIOR PREGNANCY, CURRENTLY PREGNANT: Status: RESOLVED | Noted: 2022-09-22 | Resolved: 2023-07-25

## 2023-07-25 PROBLEM — O24.419 GESTATIONAL DIABETES MELLITUS (GDM) IN THIRD TRIMESTER: Status: RESOLVED | Noted: 2023-01-13 | Resolved: 2023-07-25

## 2023-07-25 PROBLEM — R11.15 CYCLIC VOMITING SYNDROME: Status: RESOLVED | Noted: 2022-09-22 | Resolved: 2023-07-25

## 2023-07-25 PROBLEM — O12.12 PROTEINURIA AFFECTING PREGNANCY IN SECOND TRIMESTER: Status: RESOLVED | Noted: 2022-10-20 | Resolved: 2023-07-25

## 2023-07-25 PROCEDURE — G0145 SCR C/V CYTO,THINLAYER,RESCR: HCPCS | Performed by: PHYSICIAN ASSISTANT

## 2023-07-25 PROCEDURE — S0612 ANNUAL GYNECOLOGICAL EXAMINA: HCPCS | Performed by: PHYSICIAN ASSISTANT

## 2023-07-25 NOTE — PROGRESS NOTES
Patient is here today for a gynecological annual exam.   No questions or concerns today. delivered baby girl 3/20/23   LMP: breast feeding   Menarche age:11    BC: none and patient is not interested     Last pap: 2021 neg      Hx of abnormal paps?  None        Gardasil: has not/not interested

## 2023-07-25 NOTE — PROGRESS NOTES
Assessment   33 y.o. [id number] presenting for annual exam.     Plan   Diagnoses and all orders for this visit:    Routine gynecological examination    Normal findings on routine exam.  Encouraged 150 min of exercise per week. Reviewed balanced diet. Prenatal vitamin encouraged  Breast awareness/SBE encouraged    Reviewed recommended interpregnancy interval. Consistent condom use recommended for pregnancy prevention. Aware to call office if desires alternate method. Screening for cervical cancer  -     Liquid-based pap, screening    Mother currently breast-feeding          Pap - done today   Mammo - due @ 36      RTO one year for yearly exam or sooner as needed. __________________________________________________________________    Subjective     Byron Chavis is a 33 y.o. [id number] presenting for annual exam. She is without complaint and does not want STD testing today. Here for first annual s/p vaginal delivery of her daughter Camacho Sarabia) on 3/20/23. Now [id number]. Pregnancy complicated by H3SZF, rh neg, proteinuria, anxiety, cyclic vomiting syndrome, and COVID 19. Uncomplicated PP course. Did not complete 2 hr GTT. She is breastfeeding. Denies s/sx of PP depression or anxiety. EPDS 4      works as an  for [employer] all over the country - requires a lot of travel. 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 Her daughters are thriving. Oldest is adjusting well to being a big sister. Will go to school 3 days a week for 2 hrs a day during the school year. SCREENING  Last Pap: 03/31/2021 NILM  Last Mammo: n/a  Last Colonoscopy: n/a     GYN  Periods: amenorrheic since delivery (lactational amenorrhea)     Sexually active: Yes - single partner -    Contraception: condoms   Concerns: some pain relieved with position change. Mild dryness is manageable. Hx Abnormal pap: denies  We reviewed ASCCP guidelines for Pap testing today.   Gardasil: She has not completed the Gardasil series. Declines this today     Denies vaginal discharge, itching, odor, dyspareunia, pelvic pain and vulvar/vaginal symptoms    OB  [id number]       Complaints: denies   Denies urgency, frequency, hematuria, leakage / change in stream, difficulty urinating. BREAST  Complaints: denies   Denies: breast lump, breast tenderness, nipple discharge, skin color change, and skin lesion(s)  Personal hx: currently breastfeeding       Pertinent Family Hx:   Family hx of breast cancer: no  Family hx of ovarian cancer: no  Family hx of colon cancer: no      GENERAL  PMH reviewed/updated and is as below. Patient does follow with a PCP.     Past Medical History:   Diagnosis Date   • Cyclic vomiting syndrome    • Gestational diabetes mellitus (GDM) in third trimester 1/13/2023    Abnormal 1 hour Glucola, abnormal 3-hour Glucola, referral to maternal-fetal medicine placed for diabetic education and continued glucose surveillance   • Migraine     hx of   • Miscarriage     03/2015 NO D & C   • Rh incompatibility    • Scoliosis    • Varicella     had vaccines   • Visual impairment     contacts       Past Surgical History:   Procedure Laterality Date   • WISDOM TOOTH EXTRACTION Bilateral          Current Outpatient Medications:   •  Prenatal Vit-Fe Fumarate-FA (PRENATAL COMPLETE PO), Take by mouth (Patient not taking: Reported on 4/11/2023), Disp: , Rfl:     Allergies   Allergen Reactions   • Penicillins Hives     As child-unknown reaction   • Pertussis Vaccines      Screamed 5+ hours as child, did not get further dosing    • Metoclopramide Anxiety and Chest Pain       Social History     Socioeconomic History   • Marital status: /Civil Union     Spouse name: Not on file   • Number of children: Not on file   • Years of education: Not on file   • Highest education level: Not on file   Occupational History   • Not on file   Tobacco Use   • Smoking status: Never   • Smokeless tobacco: Never   Vaping Use • Vaping Use: Never used   Substance and Sexual Activity   • Alcohol use: No     Comment: social   • Drug use: No     Comment: denies self of FOB-denies family use   • Sexual activity: Yes     Partners: Male     Birth control/protection: None     Comment: FOB-Luiz   Other Topics Concern   • Not on file   Social History Narrative   • Not on file     Social Determinants of Health     Financial Resource Strain: Not on file   Food Insecurity: Not on file   Transportation Needs: Not on file   Physical Activity: Not on file   Stress: Not on file   Social Connections: Not on file   Intimate Partner Violence: Not on file   Housing Stability: Not on file       Review of Systems     ROS:  Constitutional: Negative for fatigue and unexpected weight change. Respiratory: Negative for cough and shortness of breath. Cardiovascular: Negative for chest pain and palpitations. Gastrointestinal: Negative for abdominal pain and change in bowel habits  Breasts:  Negative, other than as noted above. Genitourinary: Negative, other than as noted above. Psychiatric: Negative for mood difficulties. Objective      /70 (BP Location: Left arm, Patient Position: Sitting, Cuff Size: Standard)   Ht 5' 4" (1.626 m)   Wt 83 kg (183 lb)   LMP  (LMP Unknown)   Breastfeeding Yes   BMI 31.41 kg/m²     Physical Examination:    Patient appears well and is not in distress  Breasts are symmetrical without mass, tenderness, nipple discharge, skin changes or adenopathy. Abdomen is soft and nontender without masses. External genitals are normal without lesions or rashes. Urethral meatus and urethra are normal  Bladder is normal to palpation  Vagina is normal without discharge or bleeding. Cervix is normal without discharge or lesion. Uterus is normal, mobile, nontender without palpable mass. Adnexa are normal, nontender, without palpable mass.

## 2023-08-01 LAB
LAB AP GYN PRIMARY INTERPRETATION: NORMAL
Lab: NORMAL

## 2025-03-10 ENCOUNTER — TELEPHONE (OUTPATIENT)
Age: 30
End: 2025-03-10

## 2025-03-10 ENCOUNTER — APPOINTMENT (OUTPATIENT)
Dept: LAB | Facility: CLINIC | Age: 30
End: 2025-03-10
Payer: COMMERCIAL

## 2025-03-10 DIAGNOSIS — Z3A.01 LESS THAN 8 WEEKS GESTATION OF PREGNANCY: Primary | ICD-10-CM

## 2025-03-10 DIAGNOSIS — Z87.59 HX OF ECTOPIC PREGNANCY: ICD-10-CM

## 2025-03-10 DIAGNOSIS — Z3A.01 LESS THAN 8 WEEKS GESTATION OF PREGNANCY: ICD-10-CM

## 2025-03-10 PROCEDURE — 84702 CHORIONIC GONADOTROPIN TEST: CPT

## 2025-03-10 PROCEDURE — 36415 COLL VENOUS BLD VENIPUNCTURE: CPT

## 2025-03-10 NOTE — TELEPHONE ENCOUNTER
Patient calling in, she had a positive pregnancy last night. She believes her last period was in the beginning of February. She has regular periods every month. She has a hx of a a ruptured ectopic pregnancy in September. This was at [facility]. Reviewed ectopic precautions and when to report to the ED. Do you want us to order HCG levels for her?

## 2025-03-10 NOTE — TELEPHONE ENCOUNTER
Pt of [facility] [place] [place], last seen 7/25/23, called reporting new pregnancy. Communicated she is unsure of LMP, believes it may have been in the beginning of February.    Pt also communicated she was seen by [facility] in Sept. of 2024 and experienced an ectopic pregnancy which ruptured her R fallopian tube. Please advise. Thank you.

## 2025-03-11 LAB — B-HCG SERPL-ACNC: 2871 MIU/ML (ref 0–5)

## 2025-03-12 ENCOUNTER — APPOINTMENT (OUTPATIENT)
Dept: LAB | Facility: CLINIC | Age: 30
End: 2025-03-12
Payer: COMMERCIAL

## 2025-03-12 DIAGNOSIS — Z3A.01 LESS THAN 8 WEEKS GESTATION OF PREGNANCY: ICD-10-CM

## 2025-03-12 DIAGNOSIS — Z87.59 HX OF ECTOPIC PREGNANCY: ICD-10-CM

## 2025-03-12 LAB — B-HCG SERPL-ACNC: 5235 MIU/ML (ref 0–5)

## 2025-03-12 PROCEDURE — 84702 CHORIONIC GONADOTROPIN TEST: CPT

## 2025-03-12 PROCEDURE — 36415 COLL VENOUS BLD VENIPUNCTURE: CPT

## 2025-04-03 ENCOUNTER — ULTRASOUND (OUTPATIENT)
Dept: OBGYN CLINIC | Facility: MEDICAL CENTER | Age: 30
End: 2025-04-03
Payer: COMMERCIAL

## 2025-04-03 VITALS — SYSTOLIC BLOOD PRESSURE: 128 MMHG | DIASTOLIC BLOOD PRESSURE: 82 MMHG

## 2025-04-03 DIAGNOSIS — Z3A.08 8 WEEKS GESTATION OF PREGNANCY: ICD-10-CM

## 2025-04-03 DIAGNOSIS — N91.2 AMENORRHEA: Primary | ICD-10-CM

## 2025-04-03 PROCEDURE — 76817 TRANSVAGINAL US OBSTETRIC: CPT | Performed by: PHYSICIAN ASSISTANT

## 2025-04-03 PROCEDURE — 99213 OFFICE O/P EST LOW 20 MIN: CPT | Performed by: PHYSICIAN ASSISTANT

## 2025-04-03 NOTE — PROGRESS NOTES
Subjective  Patient ID: Stacy Engle is a 30 y.o. female here for Pregnancy Ultrasound    Newly Pregnant  No LMP recorded (lmp unknown). Patient is pregnant.     Menstrual cycle: regular  Pregnancy was unplanned/surpised.   She has not started taking a prenatal vitamin    She is C/O amenorrhea  Signs and symptoms of pregnancy:   Cramping or Pelvic Pain: no  Spotting or Vaginal Bleeding: no  Nausea or vomiting:     OB History    Para Term  AB Living   5 2 2 0 2 2   SAB IAB Ectopic Multiple Live Births   1 0 1  2      # Outcome Date GA Lbr Seth/2nd Weight Sex Type Anes PTL Lv   5 Current            4 Ectopic 09/10/24 8w0d          3 Term 23 39w1d / 00:11 2645 g (5 lb 13.3 oz) F Vag-Spont EPI N PERRI   2 Term 19 39w5d  2920 g (6 lb 7 oz) F Vag-Forceps EPI  PERRI   1 SAB 2015 5w6d             Obstetric Comments   1 miscarriage in         The following portions of the patient's history were reviewed and updated as appropriate: allergies, current medications, past family history, past medical history, past social history, past surgical history, and problem list.    Perinent hx that may affect pregnancy:  Hx of ectopic pregnancy  Hx GDM   Rh negative status       Review of Systems    See HPI for pertinent positives.             /82 (BP Location: Left arm, Patient Position: Sitting, Cuff Size: Standard)   LMP  (LMP Unknown)   OBGyn Exam  Results for orders placed or performed in visit on 25   hCG, quantitative   Result Value Ref Range    HCG, Quant 5,235.0 (H) 0 - 5 mIU/mL       FIRST TRIMESTER OBSTETRIC ULTRASOUND     No LMP recorded (lmp unknown). Patient is pregnant.    INDICATION: Establish Gestational Age       FINDINGS:  See imaging report for details     Additional Findings: none     FHR: 181 bpm  IMPRESSION:    Single intrauterine pregnancy of 8 weeks 2 days gestational age  Fetal cardiac activity detected.  No adnexal masses seen.  EDC by this Ultrasound:  25    Assigning a Final CONCEPCIÓN  Please choose how you are assigning the CONCEPCIÓN: LMP unknown, therefore the final CONCEPCIÓN will be based on the ultrasound at this encounter     Final CONCEPCIÓN: 2025 by ultrasound at this encounter.    Erika Renteria PA-C   [facility] [place] [address]  [address]  [place] [address]  Dept: [phone]  Dept Fax: [phone]  Loc: [phone]  Loc Fax: [phone]  Ultrasound Probe Disinfection    A transvaginal ultrasound was performed.   Prior to use, disinfection was performed with High Level Disinfection Process ([employer]).  Probe serial number: [id number] was used          Assessment/Plan:         1. Amenorrhea  -     Ambulatory Referral to Maternal Fetal Medicine; Future; Expected date: 2025  -     AMB US OB < 14 weeks single or first gestation level 1  2. 8 weeks gestation of pregnancy      Orders Placed This Encounter   Procedures    Ambulatory Referral to Maternal Fetal Medicine    AMB US OB < 14 weeks single or first gestation level 1

## 2025-04-15 NOTE — PATIENT INSTRUCTIONS
Congratulations!! Please review our Pregnancy Essential Guide and [facility] L&D Virtual tour from our networks website.     [facility] Pregnancy Essentials Guide  [facility] ([email or web address])     [facility] ([facility])

## 2025-04-16 ENCOUNTER — INITIAL PRENATAL (OUTPATIENT)
Dept: OBGYN CLINIC | Facility: CLINIC | Age: 30
End: 2025-04-16

## 2025-04-16 VITALS — WEIGHT: 193 LBS | BODY MASS INDEX: 32.95 KG/M2 | HEIGHT: 64 IN

## 2025-04-16 DIAGNOSIS — Z34.81 PRENATAL CARE, SUBSEQUENT PREGNANCY, FIRST TRIMESTER: Primary | ICD-10-CM

## 2025-04-16 DIAGNOSIS — Z86.32 HISTORY OF GESTATIONAL DIABETES: ICD-10-CM

## 2025-04-16 DIAGNOSIS — Z36.9 ENCOUNTER FOR ANTENATAL SCREENING: ICD-10-CM

## 2025-04-16 DIAGNOSIS — Z31.430 ENCOUNTER OF FEMALE FOR TESTING FOR GENETIC DISEASE CARRIER STATUS FOR PROCREATIVE MANAGEMENT: ICD-10-CM

## 2025-04-16 PROCEDURE — OBC

## 2025-04-16 NOTE — PROGRESS NOTES
OB INTAKE INTERVIEW  Patient is 30 y.o. who presents for OB intake at 10 1/7 wks  TELEPHONE  Encounter  The father of her baby Luiz Engle is involved in the pregnancy and is 37 years old.      Last Menstrual Period: Unknown  Ultrasound: Measured 8 weeks 2 days on 4/3/25  Estimated Date of Delivery: 25 confirmed by 8 week US    Signs/Symptoms of Pregnancy  Current pregnancy symptoms:Occasional nausea  Constipation no  Headaches no  Cramping/spotting no  PICA cravings no    Diabetes-  Body mass index is 33.13 kg/m².  If patient has 1 or more, please order early 1 hour GTT  History of GDM YES  BMI >35 no  History of PCOS or current metformin use (should stop for 7 days prior to 1hr GTT unless pre-existing diabetes)  no  History of LGA/macrosomic infant (4000g/9lbs) no    If patient has 2 or more, please order early 1 hour GTT  BMI>30 YES  AMA no  First degree relative with type 2 diabetes no  History of chronic HTN, hyperlipidemia, elevated A1C no  High risk race (, , ,  or ) no    Hypertension- if you answer yes to any of the following, please order baseline preeclampsia labs (cbc, comprehensive metabolic panel, urine protein creatinine ratio, uric acid)  History of of chronic HTN no  History of gestational HTN no  History of preeclampsia, eclampsia, or HELLP syndrome no  History of diabetes YES-GDM   History of lupus,sjogrens syndrome, kidney disease no    Thyroid- if yes order TSH with reflex T4  History of thyroid disease no    Bleeding Disorder or Hx of DVT-patient or first degree relative with history of. Order the following if not done previously.   (Factor V, antithrombin III, prothrombin gene mutation, protein C and S Ag, lupus anticoagulant, anticardiolipin, beta-2 glycoprotein)   no    OB/GYN-  History of abnormal pap smear no       Date of last pap smear 23  History of HPV no  History of Herpes/HSV no  History of other STI  (gonorrhea, chlamydia, trich) no  History of prior  YESX2  History of prior  no  History of  delivery prior to 36 weeks 6 days no  History of Varicella or Vaccination Vaccinated  History of blood transfusion no  Ok for blood transfusion YES    Substance screening-   History of tobacco use no  Currently using tobacco no  Substance Use Screen Level (N/A, LOW, HIGH) N/A    MRSA Screening-   Does the pt have a hx of MRSA? no    Immunizations:  Influenza vaccine given this season YES  Discussed Tdap vaccine YES  Discussed COVID Vaccine YEs    Genetic/[facility]-  Do you or your partner have a history of any of the following in yourselves or first degree relatives?  Cystic fibrosis no  Spinal muscular atrophy no  Hemoglobinopathy/Sickle Cell/Thalassemia no  Fragile X Intellectual Disability no    If no, discuss Carrier Screening being completed once in a lifetime as a standard of care lab test. Place orders for Cystic Fibrosis Gene Test (HHV164) and Spinal Muscular Atrophy DNA ([id number])      Appointment for Nuchal Translucency Ultrasound at [facility] scheduled for 25      Interview education  [facility] Pregnancy Essentials Book reviewed, discussed and attached to their AVS YES    Nurse/Family Partnership- patient may qualify NO; referral placed NO    Prenatal lab work scripts YES  Extra labs ordered:  Pre-E labs  1 hr GTT  SMA and CF screening    Aspirin/Preeclampsia Screen    Risk Level Risk Factor Recommendation   LOW Prior Uncomplicated full-term delivery YES No Aspirin recommendation        MODERATE Nulliparity no Recommend low-dose aspirin if     BMI>30 YES 2 or more moderate risk factors    Family History Preeclampsia (mother/sister) no     35yr old or greater no     Black Race, Concern for SDOH/Low Socioeconomic no     IVF Pregnancy  no     Personal History Risks (low birth weight, prior adverse preg outcome, >10yr preg interval) no         HIGH History of Preeclampsia no Recommend low-dose aspirin if      Multifetal gestation no 1 or more high risk factors    Chronic HTN no     Type 1 or 2 Diabetes no     Renal Disease no     Autoimmune Disease  no      Contraindications to ASA therapy:  NSAID/ ASA allergy: no  Nasal polyps: no  Asthma with history of ASA induced bronchospasm: no  Relative contraindications:  History of GI bleed: no  Active peptic ulcer disease: no  Severe hepatic dysfunction: no    Patient should be recommended to take ASA 162mg during this pregnancy from 12-36wks to lower her risk of preeclampsia: N/A          The patient has a history now or in prior pregnancy notable for:  gestational DM-2023 (diet controlled), x1 SAB, x1 Ectopic with right salpingectomy, Scoliosis, cyclic vomiting syndrome, Migraines, Anxiety-EPDS-2      Details that I feel the provider should be aware of: This is an unplanned however welcomed pregnancy for parents. Patient is feeling generally well. Patient is experiencing some occasional nausea however tolerable. Patient states with her history of cyclic vomiting syndrome her last 2 pregnancies she experienced severe N/V. OTC medications and diet were discussed.Patient history of GDM,1 hr GTT ordered. Pre-E labs ordered per screen.  Patient knows to call OB for symptoms and how to contact her nurse navigator. Patient was made aware to have lab orders completed before next appointment. Patient denies any questions at this point and verbalizes understanding.         PN1 visit scheduled. The patient was oriented to our practice, the navigator role, reviewed delivering physicians and [facility] for Delivery. All questions were answered.    Interviewed by: Regina Carroll RN  OB Nurse Navigator